# Patient Record
Sex: MALE | Race: WHITE | NOT HISPANIC OR LATINO | Employment: OTHER | URBAN - METROPOLITAN AREA
[De-identification: names, ages, dates, MRNs, and addresses within clinical notes are randomized per-mention and may not be internally consistent; named-entity substitution may affect disease eponyms.]

---

## 2017-07-25 ENCOUNTER — APPOINTMENT (OUTPATIENT)
Dept: RADIOLOGY | Facility: MEDICAL CENTER | Age: 72
DRG: 392 | End: 2017-07-25
Attending: EMERGENCY MEDICINE
Payer: MEDICARE

## 2017-07-25 ENCOUNTER — HOSPITAL ENCOUNTER (INPATIENT)
Facility: MEDICAL CENTER | Age: 72
LOS: 3 days | DRG: 392 | End: 2017-07-28
Attending: EMERGENCY MEDICINE | Admitting: HOSPITALIST
Payer: MEDICARE

## 2017-07-25 ENCOUNTER — RESOLUTE PROFESSIONAL BILLING HOSPITAL PROF FEE (OUTPATIENT)
Dept: HOSPITALIST | Facility: MEDICAL CENTER | Age: 72
End: 2017-07-25
Payer: MEDICARE

## 2017-07-25 DIAGNOSIS — R50.9 ACUTE FEBRILE ILLNESS: ICD-10-CM

## 2017-07-25 DIAGNOSIS — R74.8 ELEVATED LIVER ENZYMES: ICD-10-CM

## 2017-07-25 DIAGNOSIS — R41.82 ALTERED MENTAL STATUS, UNSPECIFIED ALTERED MENTAL STATUS TYPE: ICD-10-CM

## 2017-07-25 PROBLEM — R63.0 ANOREXIA: Status: ACTIVE | Noted: 2017-07-25

## 2017-07-25 PROBLEM — E87.6 HYPOKALEMIA DUE TO LOSS OF POTASSIUM: Status: ACTIVE | Noted: 2017-07-25

## 2017-07-25 PROBLEM — R19.7 DIARRHEA: Status: ACTIVE | Noted: 2017-07-25

## 2017-07-25 PROBLEM — R74.01 TRANSAMINITIS: Status: ACTIVE | Noted: 2017-07-25

## 2017-07-25 LAB
ALBUMIN SERPL BCP-MCNC: 4 G/DL (ref 3.2–4.9)
ALBUMIN/GLOB SERPL: 1.4 G/DL
ALP SERPL-CCNC: 140 U/L (ref 30–99)
ALT SERPL-CCNC: 79 U/L (ref 2–50)
ANION GAP SERPL CALC-SCNC: 13 MMOL/L (ref 0–11.9)
APPEARANCE UR: CLEAR
AST SERPL-CCNC: 80 U/L (ref 12–45)
BASOPHILS # BLD AUTO: 0.5 % (ref 0–1.8)
BASOPHILS # BLD: 0.03 K/UL (ref 0–0.12)
BILIRUB SERPL-MCNC: 1.6 MG/DL (ref 0.1–1.5)
BILIRUB UR QL STRIP.AUTO: NEGATIVE
BUN SERPL-MCNC: 16 MG/DL (ref 8–22)
CALCIUM SERPL-MCNC: 8.9 MG/DL (ref 8.5–10.5)
CHLORIDE SERPL-SCNC: 99 MMOL/L (ref 96–112)
CO2 SERPL-SCNC: 22 MMOL/L (ref 20–33)
COLOR UR: YELLOW
CREAT SERPL-MCNC: 0.95 MG/DL (ref 0.5–1.4)
EOSINOPHIL # BLD AUTO: 0.15 K/UL (ref 0–0.51)
EOSINOPHIL NFR BLD: 2.3 % (ref 0–6.9)
ERYTHROCYTE [DISTWIDTH] IN BLOOD BY AUTOMATED COUNT: 38.8 FL (ref 35.9–50)
GFR SERPL CREATININE-BSD FRML MDRD: >60 ML/MIN/1.73 M 2
GLOBULIN SER CALC-MCNC: 2.9 G/DL (ref 1.9–3.5)
GLUCOSE SERPL-MCNC: 131 MG/DL (ref 65–99)
GLUCOSE UR STRIP.AUTO-MCNC: NEGATIVE MG/DL
HAV IGM SERPL QL IA: NEGATIVE
HBV CORE IGM SER QL: NEGATIVE
HBV SURFACE AG SER QL: NEGATIVE
HCT VFR BLD AUTO: 33.6 % (ref 42–52)
HCV AB SER QL: NEGATIVE
HGB BLD-MCNC: 12.3 G/DL (ref 14–18)
IMM GRANULOCYTES # BLD AUTO: 0.04 K/UL (ref 0–0.11)
IMM GRANULOCYTES NFR BLD AUTO: 0.6 % (ref 0–0.9)
KETONES UR STRIP.AUTO-MCNC: NEGATIVE MG/DL
LACTATE BLD-SCNC: 1.3 MMOL/L (ref 0.5–2)
LACTATE BLD-SCNC: 1.5 MMOL/L (ref 0.5–2)
LEUKOCYTE ESTERASE UR QL STRIP.AUTO: NEGATIVE
LYMPHOCYTES # BLD AUTO: 0.25 K/UL (ref 1–4.8)
LYMPHOCYTES NFR BLD: 3.8 % (ref 22–41)
MCH RBC QN AUTO: 30.7 PG (ref 27–33)
MCHC RBC AUTO-ENTMCNC: 36.6 G/DL (ref 33.7–35.3)
MCV RBC AUTO: 83.8 FL (ref 81.4–97.8)
MICRO URNS: NORMAL
MONOCYTES # BLD AUTO: 0.48 K/UL (ref 0–0.85)
MONOCYTES NFR BLD AUTO: 7.3 % (ref 0–13.4)
NEUTROPHILS # BLD AUTO: 5.64 K/UL (ref 1.82–7.42)
NEUTROPHILS NFR BLD: 85.5 % (ref 44–72)
NITRITE UR QL STRIP.AUTO: NEGATIVE
NRBC # BLD AUTO: 0 K/UL
NRBC BLD AUTO-RTO: 0 /100 WBC
PH UR STRIP.AUTO: 6.5 [PH]
PLATELET # BLD AUTO: 135 K/UL (ref 164–446)
PMV BLD AUTO: 10 FL (ref 9–12.9)
POTASSIUM SERPL-SCNC: 2.7 MMOL/L (ref 3.6–5.5)
PROT SERPL-MCNC: 6.9 G/DL (ref 6–8.2)
PROT UR QL STRIP: NEGATIVE MG/DL
RBC # BLD AUTO: 4.01 M/UL (ref 4.7–6.1)
RBC UR QL AUTO: NEGATIVE
SODIUM SERPL-SCNC: 134 MMOL/L (ref 135–145)
SP GR UR STRIP.AUTO: 1.01
UROBILINOGEN UR STRIP.AUTO-MCNC: 1 MG/DL
WBC # BLD AUTO: 6.6 K/UL (ref 4.8–10.8)

## 2017-07-25 PROCEDURE — 81003 URINALYSIS AUTO W/O SCOPE: CPT

## 2017-07-25 PROCEDURE — 96365 THER/PROPH/DIAG IV INF INIT: CPT

## 2017-07-25 PROCEDURE — 700102 HCHG RX REV CODE 250 W/ 637 OVERRIDE(OP): Performed by: EMERGENCY MEDICINE

## 2017-07-25 PROCEDURE — A9270 NON-COVERED ITEM OR SERVICE: HCPCS | Performed by: EMERGENCY MEDICINE

## 2017-07-25 PROCEDURE — 80074 ACUTE HEPATITIS PANEL: CPT

## 2017-07-25 PROCEDURE — 71010 DX-CHEST-PORTABLE (1 VIEW): CPT

## 2017-07-25 PROCEDURE — 96367 TX/PROPH/DG ADDL SEQ IV INF: CPT

## 2017-07-25 PROCEDURE — 80053 COMPREHEN METABOLIC PANEL: CPT

## 2017-07-25 PROCEDURE — 700105 HCHG RX REV CODE 258: Performed by: EMERGENCY MEDICINE

## 2017-07-25 PROCEDURE — 770006 HCHG ROOM/CARE - MED/SURG/GYN SEMI*

## 2017-07-25 PROCEDURE — 85025 COMPLETE CBC W/AUTO DIFF WBC: CPT

## 2017-07-25 PROCEDURE — 99223 1ST HOSP IP/OBS HIGH 75: CPT | Mod: AI | Performed by: HOSPITALIST

## 2017-07-25 PROCEDURE — 83690 ASSAY OF LIPASE: CPT

## 2017-07-25 PROCEDURE — 99285 EMERGENCY DEPT VISIT HI MDM: CPT

## 2017-07-25 PROCEDURE — 36415 COLL VENOUS BLD VENIPUNCTURE: CPT

## 2017-07-25 PROCEDURE — 700111 HCHG RX REV CODE 636 W/ 250 OVERRIDE (IP): Performed by: EMERGENCY MEDICINE

## 2017-07-25 PROCEDURE — 83605 ASSAY OF LACTIC ACID: CPT

## 2017-07-25 PROCEDURE — 87040 BLOOD CULTURE FOR BACTERIA: CPT | Mod: 91

## 2017-07-25 PROCEDURE — 87086 URINE CULTURE/COLONY COUNT: CPT

## 2017-07-25 RX ORDER — POTASSIUM CHLORIDE 20 MEQ/1
40 TABLET, EXTENDED RELEASE ORAL ONCE
Status: COMPLETED | OUTPATIENT
Start: 2017-07-25 | End: 2017-07-25

## 2017-07-25 RX ORDER — CEFTRIAXONE 1 G/1
1 INJECTION, POWDER, FOR SOLUTION INTRAMUSCULAR; INTRAVENOUS ONCE
Status: COMPLETED | OUTPATIENT
Start: 2017-07-25 | End: 2017-07-25

## 2017-07-25 RX ORDER — POTASSIUM CHLORIDE 7.45 MG/ML
10 INJECTION INTRAVENOUS ONCE
Status: COMPLETED | OUTPATIENT
Start: 2017-07-25 | End: 2017-07-25

## 2017-07-25 RX ORDER — NIACIN 500 MG/1
500 TABLET, EXTENDED RELEASE ORAL NIGHTLY
COMMUNITY

## 2017-07-25 RX ORDER — LOSARTAN POTASSIUM AND HYDROCHLOROTHIAZIDE 25; 100 MG/1; MG/1
1 TABLET ORAL DAILY
COMMUNITY

## 2017-07-25 RX ORDER — ROSUVASTATIN CALCIUM 10 MG/1
10 TABLET, COATED ORAL DAILY
COMMUNITY

## 2017-07-25 RX ORDER — SODIUM CHLORIDE AND POTASSIUM CHLORIDE 150; 900 MG/100ML; MG/100ML
INJECTION, SOLUTION INTRAVENOUS CONTINUOUS
Status: DISCONTINUED | OUTPATIENT
Start: 2017-07-26 | End: 2017-07-28 | Stop reason: HOSPADM

## 2017-07-25 RX ORDER — ACETAMINOPHEN 325 MG/1
650 TABLET ORAL EVERY 6 HOURS PRN
Status: DISCONTINUED | OUTPATIENT
Start: 2017-07-25 | End: 2017-07-28 | Stop reason: HOSPADM

## 2017-07-25 RX ORDER — HYDRALAZINE HYDROCHLORIDE 25 MG/1
25 TABLET, FILM COATED ORAL 2 TIMES DAILY
COMMUNITY

## 2017-07-25 RX ORDER — SODIUM CHLORIDE 9 MG/ML
1000 INJECTION, SOLUTION INTRAVENOUS ONCE
Status: COMPLETED | OUTPATIENT
Start: 2017-07-25 | End: 2017-07-26

## 2017-07-25 RX ORDER — MELOXICAM 15 MG/1
15 TABLET ORAL DAILY
COMMUNITY

## 2017-07-25 RX ORDER — ONDANSETRON 4 MG/1
4 TABLET, ORALLY DISINTEGRATING ORAL EVERY 4 HOURS PRN
Status: DISCONTINUED | OUTPATIENT
Start: 2017-07-25 | End: 2017-07-28 | Stop reason: HOSPADM

## 2017-07-25 RX ORDER — HEPARIN SODIUM 5000 [USP'U]/ML
5000 INJECTION, SOLUTION INTRAVENOUS; SUBCUTANEOUS EVERY 8 HOURS
Status: DISCONTINUED | OUTPATIENT
Start: 2017-07-26 | End: 2017-07-28 | Stop reason: HOSPADM

## 2017-07-25 RX ORDER — SODIUM CHLORIDE 9 MG/ML
1000 INJECTION, SOLUTION INTRAVENOUS ONCE
Status: COMPLETED | OUTPATIENT
Start: 2017-07-25 | End: 2017-07-25

## 2017-07-25 RX ORDER — ONDANSETRON 2 MG/ML
4 INJECTION INTRAMUSCULAR; INTRAVENOUS EVERY 4 HOURS PRN
Status: DISCONTINUED | OUTPATIENT
Start: 2017-07-25 | End: 2017-07-28 | Stop reason: HOSPADM

## 2017-07-25 RX ADMIN — POTASSIUM CHLORIDE 10 MEQ: 10 INJECTION, SOLUTION INTRAVENOUS at 20:03

## 2017-07-25 RX ADMIN — SODIUM CHLORIDE 1000 ML: 9 INJECTION, SOLUTION INTRAVENOUS at 20:03

## 2017-07-25 RX ADMIN — SODIUM CHLORIDE 1000 ML: 9 INJECTION, SOLUTION INTRAVENOUS at 23:25

## 2017-07-25 RX ADMIN — POTASSIUM CHLORIDE 40 MEQ: 1500 TABLET, EXTENDED RELEASE ORAL at 20:03

## 2017-07-25 RX ADMIN — CEFTRIAXONE SODIUM 1 G: 1 INJECTION, POWDER, FOR SOLUTION INTRAMUSCULAR; INTRAVENOUS at 21:05

## 2017-07-25 ASSESSMENT — PAIN SCALES - WONG BAKER: WONGBAKER_NUMERICALRESPONSE: DOESN'T HURT AT ALL

## 2017-07-25 ASSESSMENT — LIFESTYLE VARIABLES: DO YOU DRINK ALCOHOL: NO

## 2017-07-25 NOTE — IP AVS SNAPSHOT
" Home Care Instructions                                                                                                                  Name:Geovanny Ibarra  Medical Record Number:[id number]  CSN: [id number]    YOB: 1945   Age: 72 y.o.  Sex: male  HT:1.727 m (5' 7.99\") WT: 82.8 kg (182 lb 8.7 oz)          Admit Date: 7/25/2017     Discharge Date:   Today's Date: 7/28/2017  Attending Doctor:  Nitihs Watson M.D.                  Allergies:  Review of patient's allergies indicates no known allergies.            Discharge Instructions       Discharge Instructions    Discharged to home by car with relative. Discharged via walking, hospital escort: Refused.  Special equipment needed: Not Applicable    Be sure to schedule a follow-up appointment with your primary care doctor or any specialists as instructed.     Discharge Plan:   Diet Plan: Discussed  Activity Level: Discussed  Confirmed Follow up Appointment: Patient to Call and Schedule Appointment  Confirmed Symptoms Management: Discussed  Medication Reconciliation Updated: Yes  Influenza Vaccine Indication: Patient Refuses    I understand that a diet low in cholesterol, fat, and sodium is recommended for good health. Unless I have been given specific instructions below for another diet, I accept this instruction as my diet prescription.   Other diet: Regular    Special Instructions: Sepsis, Adult  Sepsis is a serious infection of your blood or tissues that affects your whole body. The infection that causes sepsis may be bacterial, viral, fungal, or parasitic. Sepsis may be life threatening. Sepsis can cause your blood pressure to drop. This may result in shock. Shock causes your central nervous system and your organs to stop working correctly.   RISK FACTORS  Sepsis can happen in anyone, but it is more likely to happen in people who have weakened immune systems.  SIGNS AND SYMPTOMS   Symptoms of sepsis can include:  · Fever or low body temperature " (hypothermia).  · Rapid breathing (hyperventilation).  · Chills.  · Rapid heartbeat (tachycardia).  · Confusion or light-headedness.  · Trouble breathing.  · Urinating much less than usual.  · Cool, clammy skin or red, flushed skin.  · Other problems with the heart, kidneys, or brain.  DIAGNOSIS   Your health care provider will likely do tests to look for an infection, to see if the infection has spread to your blood, and to see how serious your condition is. Tests can include:  · Blood tests, including cultures of your blood.  · Cultures of other fluids from your body, such as:  ¨ Urine.  ¨ Pus from wounds.  ¨ Mucus coughed up from your lungs.  · Urine tests other than cultures.  · X-ray exams or other imaging tests.  TREATMENT   Treatment will begin with elimination of the source of infection. If your sepsis is likely caused by a bacterial or fungal infection, you will be given antibiotic or antifungal medicines.  You may also receive:  · Oxygen.  · Fluids through an IV tube.  · Medicines to increase your blood pressure.  · A machine to clean your blood (dialysis) if your kidneys fail.  · A machine to help you breathe if your lungs fail.  SEEK IMMEDIATE MEDICAL CARE IF:  You get an infection or develop any of the signs and symptoms of sepsis after surgery or a hospitalization.     This information is not intended to replace advice given to you by your health care provider. Make sure you discuss any questions you have with your health care provider.     Document Released: 09/15/2004 Document Revised: 05/03/2016 Document Reviewed: 08/25/2014  [employer] Interactive Patient Education ©2016 [employer]      · Is patient discharged on Warfarin / Coumadin?   No     · Is patient Post Blood Transfusion?  No    Depression / Suicide Risk    As you are discharged from this [facility] facility, it is important to learn how to keep safe from harming yourself.    Recognize the warning signs:  · Abrupt changes in personality,  positive or negative- including increase in energy   · Giving away possessions  · Change in eating patterns- significant weight changes-  positive or negative  · Change in sleeping patterns- unable to sleep or sleeping all the time   · Unwillingness or inability to communicate  · Depression  · Unusual sadness, discouragement and loneliness  · Talk of wanting to die  · Neglect of personal appearance   · Rebelliousness- reckless behavior  · Withdrawal from people/activities they love  · Confusion- inability to concentrate     If you or a loved one observes any of these behaviors or has concerns about self-harm, here's what you can do:  · Talk about it- your feelings and reasons for harming yourself  · Remove any means that you might use to hurt yourself (examples: pills, rope, extension cords, firearm)  · Get professional help from the community (Mental Health, Substance Abuse, psychological counseling)  · Do not be alone:Call your Safe Contact- someone whom you trust who will be there for you.  · Call your local CRISIS HOTLINE 668-8029 or 087-397-7687  · Call your local Children's Mobile Crisis Response Team [place] (260) 834-4772 or wwwIngresse  · Call the toll free National Suicide Prevention Hotlines   · National Suicide Prevention Lifeline 317-703-OXNS (9936)  · National Hope Line Network 800-SUICIDE (895-9583)    Thrombocytopenia  Thrombocytopenia means there are not enough platelets in your blood. Platelets are tiny cells in your blood. When you start bleeding, platelets clump together around the cut or injury to stop the bleeding. This process is called blood clotting. Not having enough platelets can cause bleeding problems.  HOME CARE  Check your skin and inside your mouth for bruises or blood as told by your doctor.  Check your spit (sputum), pee (urine), and poop (stool) for blood as told by your doctor.  Do not do activities that can cause bumps or bruises until your doctor says it is  okay.  Be careful not to cut yourself when you shave or use scissors, needles, knives, or other tools.  Be careful not to burn yourself when you iron or cook.  Ask your doctor if you can drink alcohol.  Only take medicines as told by your doctor.  Tell all your doctors and your dentist that you have this bleeding problem.  GET HELP RIGHT AWAY IF:  You are bleeding anywhere on your body.  You are bleeding or have bruises without knowing why.  You have blood in your spit, pee, or poop.  MAKE SURE YOU:  Understand these instructions.  Will watch your condition.  Will get help right away if you are not doing well or get worse.     This information is not intended to replace advice given to you by your health care provider. Make sure you discuss any questions you have with your health care provider.     Document Released: 12/06/2012 Document Revised: 03/11/2013 Document Reviewed: 12/06/2012  [employer] Interactive Patient Education ©2016 [employer]      Anemia, Frequently Asked Questions  WHAT ARE THE SYMPTOMS OF ANEMIA?  · Headache.   · Difficulty thinking.   · Fatigue.   · Shortness of breath.   · Weakness.   · Rapid heartbeat.   AT WHAT POINT ARE PEOPLE CONSIDERED ANEMIC?   This varies with gender and age.   · Both hemoglobin (Hgb) and hematocrit values are used to define anemia. These lab values are obtained from a complete blood count (CBC) test. This is performed at a caregiver's office.   · The normal range of hemoglobin values for adult men is 14.0 g/dL to 17.4 g/dL. For nonpregnant women, values are 12.3 g/dL to 15.3 g/dL.   · The World Health Organization defines anemia as less than 12 g/dL for nonpregnant women and less than 13 g/dL for men.   · For adult males, the average normal hematocrit is 46%, and the range is 40% to 52%.   · For adult females, the average normal hematocrit is 41%, and the range is 35% to 47%.   · Values that fall below the lower limits can be a sign of anemia and should have further  checking (evaluation).   GROUPS OF PEOPLE WHO ARE AT RISK FOR DEVELOPING ANEMIA INCLUDE:   · Infants who are  or taking a formula that is not fortified with iron.   · Children going through a rapid growth spurt. The iron available can not keep up with the needs for a red cell mass which must grow with the child.   · Women in childbearing years. They need iron because of blood loss during menstruation.   · Pregnant women. The growing fetus creates a high demand for iron.   · People with ongoing gastrointestinal blood loss are at risk of developing iron deficiency.   · Individuals with leukemia or cancer who must receive chemotherapy or radiation to treat their disease. The drugs or radiation used to treat these diseases often decreases the bone marrow's ability to make cells of all classes. This includes red blood cells, white blood cells, and platelets.   · Individuals with chronic inflammatory conditions such as rheumatoid arthritis or chronic infections.   · The elderly.   ARE SOME TYPES OF ANEMIA INHERITED?   · Yes, some types of anemia are due to inherited or genetic defects.   · Sickle cell anemia. This occurs most often in people of , , and Mediterranean descent.   · Thalassemia (or Fitch's anemia). This type is found in people of Mediterranean and Southeast  descent. These types of anemia are common.   · Fanconi. This is rare.   CAN CERTAIN MEDICATIONS CAUSE A PERSON TO BECOME ANEMIC?   Yes. For example, drugs to fight cancer (chemotherapeutic agents) often cause anemia. These drugs can slow the bone marrow's ability to make red blood cells. If there are not enough red blood cells, the body does not get enough oxygen.  WHAT HEMATOCRIT LEVEL IS REQUIRED TO DONATE BLOOD?   The lower limit of an acceptable hematocrit for blood donors is 38%. If you have a low hematocrit value, you should schedule an appointment with your caregiver.  ARE BLOOD TRANSFUSIONS COMMONLY USED TO  CORRECT ANEMIA, AND ARE THEY DANGEROUS?   They are used to treat anemia as a last resort. Your caregiver will find the cause of the anemia and correct it if possible. Most blood transfusions are given because of excessive bleeding at the time of surgery, with trauma, or because of bone marrow suppression in patients with cancer or leukemia on chemotherapy. Blood transfusions are safer than ever before. We also know that blood transfusions affect the immune system and may increase certain risks. There is also a concern for human error. In 1/16,000 transfusions, a patient receives a transfusion of blood that is not matched with his or her blood type.   WHAT IS IRON DEFICIENCY ANEMIA AND CAN I CORRECT IT BY CHANGING MY DIET?   Iron is an essential part of hemoglobin. Without enough hemoglobin, anemia develops and the body does not get the right amount of oxygen. Iron deficiency anemia develops after the body has had a low level of iron for a long time. This is either caused by blood loss, not taking in or absorbing enough iron, or increased demands for iron (like pregnancy or rapid growth).   Foods from animal origin such as beef, chicken, and pork, are good sources of iron. Be sure to have one of these foods at each meal. Vitamin C helps your body absorb iron. Foods rich in Vitamin C include citrus, bell pepper, strawberries, spinach and cantaloupe. In some cases, iron supplements may be needed in order to correct the iron deficiency. In the case of poor absorption, extra iron may have to be given directly into the vein through a needle (intravenously).  I HAVE BEEN DIAGNOSED WITH IRON DEFICIENCY ANEMIA AND MY CAREGIVER PRESCRIBED IRON SUPPLEMENTS. HOW LONG WILL IT TAKE FOR MY BLOOD TO BECOME NORMAL?   It depends on the degree of anemia at the beginning of treatment. Most people with mild to moderate iron deficiency, anemia will correct the anemia over a period of 2 to 3 months. But after the anemia is corrected, the  iron stored by the body is still low. Caregivers often suggest an additional 6 months of oral iron therapy once the anemia has been reversed. This will help prevent the iron deficiency anemia from quickly happening again. Non-anemic adult males should take iron supplements only under the direction of a doctor, too much iron can cause liver damage.   MY HEMOGLOBIN IS 9 G/DL AND I AM SCHEDULED FOR SURGERY. SHOULD I POSTPONE THE SURGERY?   If you have Hgb of 9, you should discuss this with your caregiver right away. Many patients with similar hemoglobin levels have had surgery without problems. If minimal blood loss is expected for a minor procedure, no treatment may be necessary.   If a greater blood loss is expected for more extensive procedures, you should ask your caregiver about being treated with erythropoietin and iron. This is to accelerate the recovery of your hemoglobin to a normal level before surgery. An anemic patient who undergoes high-blood-loss surgery has a greater risk of surgical complications and need for a blood transfusion, which also carries some risk.   I HAVE BEEN TOLD THAT HEAVY MENSTRUAL PERIODS CAUSE ANEMIA. IS THERE ANYTHING I CAN DO TO PREVENT THE ANEMIA?   Anemia that results from heavy periods is usually due to iron deficiency. You can try to meet the increased demands for iron caused by the heavy monthly blood loss by increasing the intake of iron-rich foods. Iron supplements may be required. Discuss your concerns with your caregiver.  WHAT CAUSES ANEMIA DURING PREGNANCY?   Pregnancy places major demands on the body. The mother must meet the needs of both her body and her growing baby. The body needs enough iron and folate to make the right amount of red blood cells. To prevent anemia while pregnant, the mother should stay in close contact with her caregiver.   Be sure to eat a diet that has foods rich in iron and folate like liver and dark green leafy vegetables. Folate plays an  important role in the normal development of a baby's spinal cord. Folate can help prevent serious disorders like spina bifida. If your diet does not provide adequate nutrients, you may want to talk with your caregiver about nutritional supplements.   WHAT IS THE RELATIONSHIP BETWEEN FIBROID TUMORS AND ANEMIA IN WOMEN?   The relationship is usually caused by the increased menstrual blood loss caused by fibroids. Good iron intake may be required to prevent iron deficiency anemia from developing.   Document Released: 07/26/2005 Document Revised: 03/11/2013 Document Reviewed: 01/10/2012  [employer]® Patient Information ©2013 [employer].    Hypokalemia  Hypokalemia means that the amount of potassium in the blood is lower than normal. Potassium is a chemical, called an electrolyte, that helps regulate the amount of fluid in the body. It also stimulates muscle contraction and helps nerves function properly. Most of the body's potassium is inside of cells, and only a very small amount is in the blood. Because the amount in the blood is so small, minor changes can be life-threatening.  CAUSES  · Antibiotics.  · Diarrhea or vomiting.  · Using laxatives too much, which can cause diarrhea.  · Chronic kidney disease.  · Water pills (diuretics).  · Eating disorders (bulimia).  · Low magnesium level.  · Sweating a lot.  SIGNS AND SYMPTOMS  · Weakness.  · Constipation.  · Fatigue.  · Muscle cramps.  · Mental confusion.  · Skipped heartbeats or irregular heartbeat (palpitations).  · Tingling or numbness.  DIAGNOSIS   Your health care provider can diagnose hypokalemia with blood tests. In addition to checking your potassium level, your health care provider may also check other lab tests.  TREATMENT  Hypokalemia can be treated with potassium supplements taken by mouth or adjustments in your current medicines. If your potassium level is very low, you may need to get potassium through a vein (IV) and be monitored in the hospital. A  diet high in potassium is also helpful. Foods high in potassium are:  · Nuts, such as peanuts and pistachios.  · Seeds, such as sunflower seeds and pumpkin seeds.  · Peas, lentils, and lima beans.  · Whole grain and bran cereals and breads.  · Fresh fruit and vegetables, such as apricots, avocado, bananas, cantaloupe, kiwi, oranges, tomatoes, asparagus, and potatoes.  · Orange and tomato juices.  · Red meats.  · Fruit yogurt.  HOME CARE INSTRUCTIONS  · Take all medicines as prescribed by your health care provider.  · Maintain a healthy diet by including nutritious food, such as fruits, vegetables, nuts, whole grains, and lean meats.  · If you are taking a laxative, be sure to follow the directions on the label.  SEEK MEDICAL CARE IF:  · Your weakness gets worse.  · You feel your heart pounding or racing.  · You are vomiting or having diarrhea.  · You are diabetic and having trouble keeping your blood glucose in the normal range.  SEEK IMMEDIATE MEDICAL CARE IF:  · You have chest pain, shortness of breath, or dizziness.  · You are vomiting or having diarrhea for more than 2 days.  · You faint.  MAKE SURE YOU:   · Understand these instructions.  · Will watch your condition.  · Will get help right away if you are not doing well or get worse.     This information is not intended to replace advice given to you by your health care provider. Make sure you discuss any questions you have with your health care provider.     Document Released: 12/18/2006 Document Revised: 01/08/2016 Document Reviewed: 06/20/2014  [employer] Interactive Patient Education ©2016 [employer]    Ciprofloxacin extended-release tablets  What is this medicine?  CIPROFLOXACIN (sip dorene FLOX a sin) is a quinolone antibiotic. It is used to treat certain kinds of bacterial infections. It will not work for colds, flu, or other viral infections.  This medicine may be used for other purposes; ask your health care provider or pharmacist if you have  questions.  COMMON BRAND NAME(S): Cipro XR, Proquin XR  What should I tell my health care provider before I take this medicine?  They need to know if you have any of these conditions:  -bone problems  -cerebral disease  -  irregular heartbeat  -joint problems  -kidney disease  -liver disease  -myasthenia gravis  -seizures disorder  -tendon problems  -an unusual or allergic reaction to ciprofloxacin, other antibiotics or medicines, foods, dyes, or preservatives  -pregnant or trying to get pregnant  -breast-feeding  How should I use this medicine?  Take this medicine by mouth with a full glass of water. Follow the directions on the prescription label. Do not split, crush, or chew the tablet. Take your medicine at regular intervals. Do not take your medicine more often than directed. Take all of your medicine as directed even if you think your are better. Do not skip doses or stop your medicine early.  You can take this medicine with food or on an empty stomach. It can be taken with a meal that contains dairy or calcium, but do not take it alone with a dairy product, like milk or yogurt, or calcium-fortified juice.  A special MedGuide will be given to you by the pharmacist with each prescription and refill. Be sure to read this information carefully each time.  Talk to your pediatrician regarding the use of this medicine in children. Special care may be needed.  Overdosage: If you think you have taken too much of this medicine contact a poison control center or emergency room at once.  NOTE: This medicine is only for you. Do not share this medicine with others.  What if I miss a dose?  If you miss a dose, take it as soon as you can. If it is almost time for your next dose, take only that dose. Do not take double or extra doses. Do not take more than one dose in a day.  What may interact with this medicine?  Do not take this medicine with any of the following  medications:  -cisapride  -droperidol  -terfenadine  -tizanidine  This medicine may also interact with the following medications:  -antacids  -caffeine  -cyclosporin  -didanosine (ddI) buffered tablets or powder  -medicines for diabetes  -medicines for inflammation like ibuprofen, naproxen  -methotrexate  -multivitamins  -omeprazole  -phenytoin  -probenecid  -sucralfate  -theophylline  -warfarin  This list may not describe all possible interactions. Give your health care provider a list of all the medicines, herbs, non-prescription drugs, or dietary supplements you use. Also tell them if you smoke, drink alcohol, or use illegal drugs. Some items may interact with your medicine.  What should I watch for while using this medicine?  Tell your doctor or health care professional if your symptoms do not improve.  Do not treat diarrhea with over the counter products. Contact your doctor if you have diarrhea that lasts more than 2 days or if it is severe and watery.  You may get drowsy or dizzy. Do not drive, use machinery, or do anything that needs mental alertness until you know how this medicine affects you. Do not stand or sit up quickly, especially if you are an older patient. This reduces the risk of dizzy or fainting spells.  This medicine can make you more sensitive to the sun. Keep out of the sun. If you cannot avoid being in the sun, wear protective clothing and use sunscreen. Do not use sun lamps or tanning beds/booths.  Avoid antacids, aluminum, calcium, iron, magnesium, and zinc products for 6 hours before and 2 hours after taking a dose of this medicine.  What side effects may I notice from receiving this medicine?  Side effects that you should report to your doctor or health care professional as soon as possible:  -  allergic reactions like skin rash, itching or hives, swelling of the face, lips, or tongue  -  breathing problems  -  confusion, nightmares or hallucinations  -  feeling faint or lightheaded,  falls  -  irregular heartbeat  -  joint, muscle or tendon pain or swelling  -  pain or trouble passing urine  -persistent headache with or without blurred vision  -  redness, blistering, peeling or loosening of the skin, including inside the mouth  -  seizures  -  unusual pain, numbness, tingling, or weakness  Side effects that usually do not require medical attention (report to your doctor or health care professional if they continue or are bothersome):  -  diarrhea  -  nausea or stomach upset  -  white patches or sores in the mouth  This list may not describe all possible side effects. Call your doctor for medical advice about side effects. You may report side effects to FDA at 7-061-FDA-8375.  Where should I keep my medicine?  Keep out of the reach of children.  Store at room temperature between 15 to 30 degrees C (59 to 86 degrees F). Keep container tightly closed. Throw away any unused medicine after the expiration date.  NOTE: This sheet is a summary. It may not cover all possible information. If you have questions about this medicine, talk to your doctor, pharmacist, or health care provider.  © 2014, [employer]. (1/4/2013 12:48:34 PM)  Metronidazole tablets or capsules  What is this medicine?  METRONIDAZOLE (me troe NI da zole) is an antiinfective. It is used to treat certain kinds of bacterial and protozoal infections. It will not work for colds, flu, or other viral infections.  This medicine may be used for other purposes; ask your health care provider or pharmacist if you have questions.  COMMON BRAND NAME(S): Flagyl  What should I tell my health care provider before I take this medicine?  They need to know if you have any of these conditions:  -anemia or other blood disorders  -disease of the nervous system  -fungal or yeast infection  -if you drink alcohol containing drinks  -liver disease  -seizures  -an unusual or allergic reaction to metronidazole, or other medicines, foods, dyes, or  preservatives  -pregnant or trying to get pregnant  -breast-feeding  How should I use this medicine?  Take this medicine by mouth with a full glass of water. Follow the directions on the prescription label. Take your medicine at regular intervals. Do not take your medicine more often than directed. Take all of your medicine as directed even if you think you are better. Do not skip doses or stop your medicine early.  Talk to your pediatrician regarding the use of this medicine in children. Special care may be needed.  Overdosage: If you think you have taken too much of this medicine contact a poison control center or emergency room at once.  NOTE: This medicine is only for you. Do not share this medicine with others.  What if I miss a dose?  If you miss a dose, take it as soon as you can. If it is almost time for your next dose, take only that dose. Do not take double or extra doses.  What may interact with this medicine?  Do not take this medicine with any of the following medications:  -alcohol or any product that contains alcohol  -amprenavir oral solution  -disulfiram  -paclitaxel injection  -ritonavir oral solution  -sertraline oral solution  -sulfamethoxazole-trimethoprim injection  This medicine may also interact with the following medications:  -cimetidine  -lithium  -phenobarbital  -phenytoin  -warfarin  This list may not describe all possible interactions. Give your health care provider a list of all the medicines, herbs, non-prescription drugs, or dietary supplements you use. Also tell them if you smoke, drink alcohol, or use illegal drugs. Some items may interact with your medicine.  What should I watch for while using this medicine?  Tell your doctor or health care professional if your symptoms do not improve or if they get worse.  You may get drowsy or dizzy. Do not drive, use machinery, or do anything that needs mental alertness until you know how this medicine affects you. Do not stand or sit up  quickly, especially if you are an older patient. This reduces the risk of dizzy or fainting spells.  Avoid alcoholic drinks while you are taking this medicine and for three days afterward. Alcohol may make you feel dizzy, sick, or flushed.  If you are being treated for a sexually transmitted disease, avoid sexual contact until you have finished your treatment. Your sexual partner may also need treatment.  What side effects may I notice from receiving this medicine?  Side effects that you should report to your doctor or health care professional as soon as possible:  -allergic reactions like skin rash or hives, swelling of the face, lips, or tongue  -confusion, clumsiness  -difficulty speaking  -discolored or sore mouth  -dizziness  -fever, infection  -numbness, tingling, pain or weakness in the hands or feet  -trouble passing urine or change in the amount of urine  -redness, blistering, peeling or loosening of the skin, including inside the mouth  -seizures  -unusually weak or tired  -vaginal irritation, dryness, or discharge  Side effects that usually do not require medical attention (report to your doctor or health care professional if they continue or are bothersome):  -diarrhea  -headache  -irritability  -metallic taste  -nausea  -stomach pain or cramps  -trouble sleeping  This list may not describe all possible side effects. Call your doctor for medical advice about side effects. You may report side effects to FDA at 9-278-FDA-0763.  Where should I keep my medicine?  Keep out of the reach of children.  Store at room temperature below 25 degrees C (77 degrees F). Protect from light. Keep container tightly closed. Throw away any unused medicine after the expiration date.  NOTE: This sheet is a summary. It may not cover all possible information. If you have questions about this medicine, talk to your doctor, pharmacist, or health care provider.  © 2014, [employer]. (10/5/2009 10:37:11 AM)      Follow-up  Information     1. Follow up with Pcp Not In Computer In 2 weeks.    Specialty:  Family Medicine         Discharge Medication Instructions:    Below are the medications your physician expects you to take upon discharge:    Review all your home medications and newly ordered medications with your doctor and/or pharmacist. Follow medication instructions as directed by your doctor and/or pharmacist.    Please keep your medication list with you and share with your physician.               Medication List      START taking these medications        Instructions    Morning Afternoon Evening Bedtime    ciprofloxacin 500 MG Tabs   Commonly known as:  CIPRO   Next Dose Due:  Today           Take 1 Tab by mouth 2 times a day for 4 days.   Dose:  500 mg                        ferrous sulfate 325 (65 FE) MG EC tablet   Next Dose Due:  Today           Take 1 Tab by mouth 3 times a day, with meals.   Dose:  325 mg                        metronidazole 500 MG Tabs   Commonly known as:  FLAGYL   Next Dose Due:  Today         Take 1 Tab by mouth every 8 hours for 4 days.   Dose:  500 mg                          CONTINUE taking these medications        Instructions    Morning Afternoon Evening Bedtime    aspirin  MG Tbec   Last time this was given:  325 mg on 7/28/2017  7:49 AM   Commonly known as:  ECOTRIN   Next Dose Due:  Tomorrow           Take 325 mg by mouth every day.   Dose:  325 mg                        hydrALAZINE 25 MG Tabs   Commonly known as:  APRESOLINE   Next Dose Due:  Resume home regimen         Take 25 mg by mouth 2 Times a Day.   Dose:  25 mg                        losartan-hydrochlorothiazide 100-25 MG per tablet   Commonly known as:  HYZAAR   Next Dose Due:  Resume home regimen           Take 1 Tab by mouth every day.   Dose:  1 Tab                        meloxicam 15 MG tablet   Commonly known as:  MOBIC   Next Dose Due:  Resume home regimen           Take 15 mg by mouth every day.   Dose:  15 mg                         niacin  MG Tbcr   Commonly known as:  NIASPAN   Next Dose Due:  Resume home regimen           Take 500 mg by mouth every evening.   Dose:  500 mg                        rosuvastatin 10 MG Tabs   Commonly known as:  CRESTOR   Next Dose Due:  Resume home regimen           Take 10 mg by mouth every day.   Dose:  10 mg                             Where to Get Your Medications      These medications were sent to [id number] - [place], [place] - [address]  [address] [place] [address]     Phone:  [phone]    - ciprofloxacin 500 MG Tabs  - ferrous sulfate 325 (65 FE) MG EC tablet  - metronidazole 500 MG Tabs            Instructions           Diet / Nutrition:    Follow any diet instructions given to you by your doctor or the dietician, including how much salt (sodium) you are allowed each day.    If you are overweight, talk to your doctor about a weight reduction plan.    Activity:    Remain physically active following your doctor's instructions about exercise and activity.    Rest often.     Any time you become even a little tired or short of breath, SIT DOWN and rest.    Worsening Symptoms:    Report any of the following signs and symptoms to the doctor's office immediately:    *Pain of jaw, arm, or neck  *Chest pain not relieved by medication                               *Dizziness or loss of consciousness  *Difficulty breathing even when at rest   *More tired than usual                                       *Bleeding drainage or swelling of surgical site  *Swelling of feet, ankles, legs or stomach                 *Fever (>100ºF)  *Pink or blood tinged sputum  *Weight gain (3lbs/day or 5lbs /week)           *Shock from internal defibrillator (if applicable)  *Palpitations or irregular heartbeats                *Cool and/or numb extremities    Stroke Awareness    Common Risk Factors for Stroke include:    Age  Atrial Fibrillation  Carotid Artery Stenosis  Diabetes Mellitus  Excessive alcohol  consumption  High blood pressure  Overweight   Physical inactivity  Smoking    Warning signs and symptoms of a stroke include:    *Sudden numbness or weakness of the face, arm or leg (especially on one side of the body).  *Sudden confusion, trouble speaking or understanding.  *Sudden trouble seeing in one or both eyes.  *Sudden trouble walking, dizziness, loss of balance or coordination.Sudden severe headache with no known cause.    It is very important to get treatment quickly when a stroke occurs. If you experience any of the above warning signs, call 911 immediately.                   Disclaimer         Quit Smoking / Tobacco Use:    I understand the use of any tobacco products increases my chance of suffering from future heart disease or stroke and could cause other illnesses which may shorten my life. Quitting the use of tobacco products is the single most important thing I can do to improve my health. For further information on smoking / tobacco cessation call a Toll Free Quit Line at 1-225.915.6400 (*[facility]) or 1-327.148.7305 (American [employer] Association) or you can access the web based program at www.lungLeBUZZ.org.    [place] Tobacco Users Help Line:  (756) 430-2378       Toll Free: 1-620.563.5096  Quit Tobacco Program [facility] (860)708-7374    Crisis Hotline:    [place] Crisis Hotline:  3-626-JKTMQZS or 1-803.837.6728    [place] Crisis Hotline:    1-476.838.5208 or 347-820-6772    Discharge Survey:   Thank you for choosing [facility]. We hope we did everything we could to make your hospital stay a pleasant one. You may be receiving a phone survey and we would appreciate your time and participation in answering the questions. Your input is very valuable to us in our efforts to improve our service to our patients and their families.        My signature on this form indicates that:    1. I have reviewed and understand the above information.  2. My questions regarding  this information have been answered to my satisfaction.  3. I have formulated a plan with my discharge nurse to obtain my prescribed medications for home.                  Disclaimer         __________________________________                     __________       ________                       Patient Signature                                                 Date                    Time

## 2017-07-25 NOTE — IP AVS SNAPSHOT
" <p align=\"LEFT\"><IMG SRC=\"//[email or web address]$/[email or web address]\" alt=\"Image\" WIDTH=\"50%\" HEIGHT=\"200\" BORDER=\"\"></p>                   Name:Geovanny Ibarra  Medical Record Number:[id number]  CSN: [id number]    YOB: 1945   Age: 72 y.o.  Sex: male  HT:1.727 m (5' 7.99\") WT: 82.8 kg (182 lb 8.7 oz)          Admit Date: 7/25/2017     Discharge Date:   Today's Date: 7/28/2017  Attending Doctor:  Nitish Watson M.D.                  Allergies:  Review of patient's allergies indicates no known allergies.          Follow-up Information     1. Follow up with Pcp Not In Computer In 2 weeks.    Specialty:  Family Medicine         Medication List      Take these Medications        Instructions    aspirin  MG Tbec   Commonly known as:  ECOTRIN    Take 325 mg by mouth every day.   Dose:  325 mg       ciprofloxacin 500 MG Tabs   Commonly known as:  CIPRO    Take 1 Tab by mouth 2 times a day for 4 days.   Dose:  500 mg       ferrous sulfate 325 (65 FE) MG EC tablet    Take 1 Tab by mouth 3 times a day, with meals.   Dose:  325 mg       hydrALAZINE 25 MG Tabs   Commonly known as:  APRESOLINE    Take 25 mg by mouth 2 Times a Day.   Dose:  25 mg       losartan-hydrochlorothiazide 100-25 MG per tablet   Commonly known as:  HYZAAR    Take 1 Tab by mouth every day.   Dose:  1 Tab       meloxicam 15 MG tablet   Commonly known as:  MOBIC    Take 15 mg by mouth every day.   Dose:  15 mg       metronidazole 500 MG Tabs   Commonly known as:  FLAGYL    Take 1 Tab by mouth every 8 hours for 4 days.   Dose:  500 mg       niacin  MG Tbcr   Commonly known as:  NIASPAN    Take 500 mg by mouth every evening.   Dose:  500 mg       rosuvastatin 10 MG Tabs   Commonly known as:  CRESTOR    Take 10 mg by mouth every day.   Dose:  10 mg         "

## 2017-07-25 NOTE — IP AVS SNAPSHOT
Eyetronics Access Code: [email or web address]  Expires: 8/27/2017 10:58 AM    Your email address is not on file at [facility].  Email Addresses are required for you to sign up for Eyet[employer]ics, please contact [phone] to verify your personal information and to provide your email address prior to attempting to register for Eyetronics.    Geovanny Ibarra  [address] [place] [address]    Eyetronics  A secure, online tool to manage your health information     [facility]’s Eyetronics® is a secure, online tool that connects you to your personalized health information from the privacy of your home -- day or night - making it very easy for you to manage your healthcare. Once the activation process is completed, you can even access your medical information using the Eyetronics raheem, which is available for free in the Apple Raheem store or Google Play store.     To learn more about Eyetronics, visit [email or web address]    There are two levels of access available (as shown below):   My Chart Features  [facility] Primary Care Doctor [facility]  Specialists [facility]  Urgent  Care Non-[facility] Primary Care Doctor   Email your healthcare team securely and privately 24/7 X X X    Manage appointments: schedule your next appointment; view details of past/upcoming appointments X      Request prescription refills. X      View recent personal medical records, including lab and immunizations X X X X   View health record, including health history, allergies, medications X X X X   Read reports about your outpatient visits, procedures, consult and ER notes X X X X   See your discharge summary, which is a recap of your hospital and/or ER visit that includes your diagnosis, lab results, and care plan X X  X     How to register for E[employer]ics:  Once your e-mail address has been verified, follow the following steps to sign up for [employer].     1. Go to  [email or web address]  2. Click on the Sign Up Now box, which takes you to the New Member Sign  Up page. You will need to provide the following information:  a. Enter your i[employer]li Access Code exactly as it appears at the top of this page. (You will not need to use this code after you’ve completed the sign-up process. If you do not sign up before the expiration date, you must request a new code.)   b. Enter your date of birth.   c. Enter your home email address.   d. Click Submit, and follow the next screen’s instructions.  3. Create a iSuppli ID. This will be your iSuppli login ID and cannot be changed, so think of one that is secure and easy to remember.  4. Create a iSuppli password. You can change your password at any time.  5. Enter your Password Reset Question and Answer. This can be used at a later time if you forget your password.   6. Enter your e-mail address. This allows you to receive e-mail notifications when new information is available in iSuppli.  7. Click Sign Up. You can now view your health information.    For assistance activating your iSuppli account, call [phone]

## 2017-07-25 NOTE — IP AVS SNAPSHOT
7/28/2017    Geovanny Ibarra  [address] [address]    Dear Geovanny:    [facility] wants to ensure your discharge home is safe and you or your loved ones have had all of your questions answered regarding your care after you leave the hospital.    Below is a list of resources and contact information should you have any questions regarding your hospital stay, follow-up instructions, or active medical symptoms.    Questions or Concerns Regarding… Contact   Medical Questions Related to Your Discharge  (7 days a week, 8am-5pm) Contact a Nurse Care Coordinator   [phone]   Medical Questions Not Related to Your Discharge  (24 hours a day / 7 days a week)  Contact the Nurse Health Line   [phone]    Medications or Discharge Instructions Refer to your discharge packet   or contact your [facility] Primary Care Provider   [phone]   Follow-up Appointment(s) Schedule your appointment via [employer]   or contact Scheduling [phone]   Billing Review your statement via [employer]  or contact Billing [phone]   Medical Records Review your records via [employer]   or contact Medical Records [phone]     You may receive a telephone call within two days of discharge. This call is to make certain you understand your discharge instructions and have the opportunity to have any questions answered. You can also easily access your medical information, test results and upcoming appointments via the [employer] free online health management tool. You can learn more and sign up at [email or web address]. For assistance setting up your [employer] account, please call [phone].    Once again, we want to ensure your discharge home is safe and that you have a clear understanding of any next steps in your care. If you have any questions or concerns, please do not hesitate to contact us, we are here for you. Thank you for choosing [facility] for your healthcare needs.    Sincerely,    Your [facility] Healthcare Team

## 2017-07-26 ENCOUNTER — APPOINTMENT (OUTPATIENT)
Dept: RADIOLOGY | Facility: MEDICAL CENTER | Age: 72
DRG: 392 | End: 2017-07-26
Attending: HOSPITALIST
Payer: MEDICARE

## 2017-07-26 PROBLEM — D64.9 ANEMIA: Status: ACTIVE | Noted: 2017-07-26

## 2017-07-26 PROBLEM — D69.6 THROMBOCYTOPENIA (HCC): Status: ACTIVE | Noted: 2017-07-26

## 2017-07-26 LAB
ALBUMIN SERPL BCP-MCNC: 3.6 G/DL (ref 3.2–4.9)
ALBUMIN/GLOB SERPL: 1.4 G/DL
ALP SERPL-CCNC: 166 U/L (ref 30–99)
ALT SERPL-CCNC: 120 U/L (ref 2–50)
ANION GAP SERPL CALC-SCNC: 9 MMOL/L (ref 0–11.9)
ANISOCYTOSIS BLD QL SMEAR: ABNORMAL
AST SERPL-CCNC: 172 U/L (ref 12–45)
BASOPHILS # BLD AUTO: 0.9 % (ref 0–1.8)
BASOPHILS # BLD: 0.04 K/UL (ref 0–0.12)
BILIRUB SERPL-MCNC: 1.8 MG/DL (ref 0.1–1.5)
BUN SERPL-MCNC: 14 MG/DL (ref 8–22)
CALCIUM SERPL-MCNC: 8.4 MG/DL (ref 8.5–10.5)
CHLORIDE SERPL-SCNC: 104 MMOL/L (ref 96–112)
CO2 SERPL-SCNC: 25 MMOL/L (ref 20–33)
CREAT SERPL-MCNC: 0.9 MG/DL (ref 0.5–1.4)
EOSINOPHIL # BLD AUTO: 0.15 K/UL (ref 0–0.51)
EOSINOPHIL NFR BLD: 3.5 % (ref 0–6.9)
ERYTHROCYTE [DISTWIDTH] IN BLOOD BY AUTOMATED COUNT: 41.3 FL (ref 35.9–50)
GFR SERPL CREATININE-BSD FRML MDRD: >60 ML/MIN/1.73 M 2
GLOBULIN SER CALC-MCNC: 2.5 G/DL (ref 1.9–3.5)
GLUCOSE SERPL-MCNC: 116 MG/DL (ref 65–99)
HCT VFR BLD AUTO: 33.2 % (ref 42–52)
HGB BLD-MCNC: 11.3 G/DL (ref 14–18)
LIPASE SERPL-CCNC: 35 U/L (ref 11–82)
LYMPHOCYTES # BLD AUTO: 0.6 K/UL (ref 1–4.8)
LYMPHOCYTES NFR BLD: 13.9 % (ref 22–41)
MANUAL DIFF BLD: NORMAL
MCH RBC QN AUTO: 29.7 PG (ref 27–33)
MCHC RBC AUTO-ENTMCNC: 34 G/DL (ref 33.7–35.3)
MCV RBC AUTO: 87.1 FL (ref 81.4–97.8)
MICROCYTES BLD QL SMEAR: ABNORMAL
MONOCYTES # BLD AUTO: 0.04 K/UL (ref 0–0.85)
MONOCYTES NFR BLD AUTO: 0.9 % (ref 0–13.4)
MORPHOLOGY BLD-IMP: NORMAL
NEUTROPHILS # BLD AUTO: 3.47 K/UL (ref 1.82–7.42)
NEUTROPHILS NFR BLD: 75.6 % (ref 44–72)
NEUTS BAND NFR BLD MANUAL: 5.2 % (ref 0–10)
NRBC # BLD AUTO: 0 K/UL
NRBC BLD AUTO-RTO: 0 /100 WBC
PLATELET # BLD AUTO: 116 K/UL (ref 164–446)
PMV BLD AUTO: 10.1 FL (ref 9–12.9)
POTASSIUM SERPL-SCNC: 3.9 MMOL/L (ref 3.6–5.5)
PROT SERPL-MCNC: 6.1 G/DL (ref 6–8.2)
RBC # BLD AUTO: 3.81 M/UL (ref 4.7–6.1)
RBC BLD AUTO: PRESENT
SODIUM SERPL-SCNC: 138 MMOL/L (ref 135–145)
WBC # BLD AUTO: 4.3 K/UL (ref 4.8–10.8)

## 2017-07-26 PROCEDURE — 700117 HCHG RX CONTRAST REV CODE 255: Performed by: HOSPITALIST

## 2017-07-26 PROCEDURE — 96367 TX/PROPH/DG ADDL SEQ IV INF: CPT

## 2017-07-26 PROCEDURE — 86664 EPSTEIN-BARR NUCLEAR ANTIGEN: CPT

## 2017-07-26 PROCEDURE — 87046 STOOL CULTR AEROBIC BACT EA: CPT

## 2017-07-26 PROCEDURE — 700105 HCHG RX REV CODE 258: Performed by: HOSPITALIST

## 2017-07-26 PROCEDURE — 74177 CT ABD & PELVIS W/CONTRAST: CPT

## 2017-07-26 PROCEDURE — 87899 AGENT NOS ASSAY W/OPTIC: CPT

## 2017-07-26 PROCEDURE — 86665 EPSTEIN-BARR CAPSID VCA: CPT | Mod: 91

## 2017-07-26 PROCEDURE — 87045 FECES CULTURE AEROBIC BACT: CPT

## 2017-07-26 PROCEDURE — 700111 HCHG RX REV CODE 636 W/ 250 OVERRIDE (IP): Performed by: HOSPITALIST

## 2017-07-26 PROCEDURE — 85027 COMPLETE CBC AUTOMATED: CPT

## 2017-07-26 PROCEDURE — 76705 ECHO EXAM OF ABDOMEN: CPT

## 2017-07-26 PROCEDURE — 700101 HCHG RX REV CODE 250: Performed by: HOSPITALIST

## 2017-07-26 PROCEDURE — 700102 HCHG RX REV CODE 250 W/ 637 OVERRIDE(OP): Performed by: HOSPITALIST

## 2017-07-26 PROCEDURE — 80053 COMPREHEN METABOLIC PANEL: CPT

## 2017-07-26 PROCEDURE — 36415 COLL VENOUS BLD VENIPUNCTURE: CPT

## 2017-07-26 PROCEDURE — 770020 HCHG ROOM/CARE - TELE (206)

## 2017-07-26 PROCEDURE — 99233 SBSQ HOSP IP/OBS HIGH 50: CPT | Performed by: HOSPITALIST

## 2017-07-26 PROCEDURE — A9270 NON-COVERED ITEM OR SERVICE: HCPCS | Performed by: HOSPITALIST

## 2017-07-26 PROCEDURE — 78226 HEPATOBILIARY SYSTEM IMAGING: CPT

## 2017-07-26 PROCEDURE — 86663 EPSTEIN-BARR ANTIBODY: CPT

## 2017-07-26 PROCEDURE — 85007 BL SMEAR W/DIFF WBC COUNT: CPT

## 2017-07-26 RX ADMIN — POTASSIUM CHLORIDE AND SODIUM CHLORIDE: 900; 150 INJECTION, SOLUTION INTRAVENOUS at 01:52

## 2017-07-26 RX ADMIN — IOHEXOL 100 ML: 350 INJECTION, SOLUTION INTRAVENOUS at 10:00

## 2017-07-26 RX ADMIN — METRONIDAZOLE 500 MG: 500 INJECTION, SOLUTION INTRAVENOUS at 00:15

## 2017-07-26 RX ADMIN — ACETAMINOPHEN 650 MG: 325 TABLET, FILM COATED ORAL at 23:27

## 2017-07-26 RX ADMIN — PIPERACILLIN SODIUM AND TAZOBACTAM SODIUM 3.38 G: 3; .375 INJECTION, POWDER, FOR SOLUTION INTRAVENOUS at 11:59

## 2017-07-26 RX ADMIN — CEFTRIAXONE 2 G: 2 INJECTION, POWDER, FOR SOLUTION INTRAMUSCULAR; INTRAVENOUS at 08:52

## 2017-07-26 RX ADMIN — POTASSIUM CHLORIDE AND SODIUM CHLORIDE: 900; 150 INJECTION, SOLUTION INTRAVENOUS at 11:59

## 2017-07-26 RX ADMIN — PIPERACILLIN SODIUM AND TAZOBACTAM SODIUM 3.38 G: 3; .375 INJECTION, POWDER, FOR SOLUTION INTRAVENOUS at 23:27

## 2017-07-26 RX ADMIN — PIPERACILLIN SODIUM AND TAZOBACTAM SODIUM 3.38 G: 3; .375 INJECTION, POWDER, FOR SOLUTION INTRAVENOUS at 17:28

## 2017-07-26 RX ADMIN — IOHEXOL 50 ML: 240 INJECTION, SOLUTION INTRATHECAL; INTRAVASCULAR; INTRAVENOUS; ORAL at 10:00

## 2017-07-26 RX ADMIN — ASPIRIN 325 MG: 325 TABLET, DELAYED RELEASE ORAL at 08:52

## 2017-07-26 ASSESSMENT — COGNITIVE AND FUNCTIONAL STATUS - GENERAL
MOBILITY SCORE: 24
MOBILITY SCORE: 24
SUGGESTED CMS G CODE MODIFIER DAILY ACTIVITY: CH
SUGGESTED CMS G CODE MODIFIER MOBILITY: CH
SUGGESTED CMS G CODE MODIFIER MOBILITY: CH
SUGGESTED CMS G CODE MODIFIER DAILY ACTIVITY: CH
DAILY ACTIVITIY SCORE: 24
DAILY ACTIVITIY SCORE: 24

## 2017-07-26 ASSESSMENT — LIFESTYLE VARIABLES
EVER HAD A DRINK FIRST THING IN THE MORNING TO STEADY YOUR NERVES TO GET RID OF A HANGOVER: NO
TOTAL SCORE: 0
EVER_SMOKED: YES
EVER FELT BAD OR GUILTY ABOUT YOUR DRINKING: NO
HAVE PEOPLE ANNOYED YOU BY CRITICIZING YOUR DRINKING: NO
CONSUMPTION TOTAL: NEGATIVE
SUBSTANCE_ABUSE: 0
HOW MANY TIMES IN THE PAST YEAR HAVE YOU HAD 5 OR MORE DRINKS IN A DAY: 0
TOTAL SCORE: 0
ALCOHOL_USE: YES
AVERAGE NUMBER OF DAYS PER WEEK YOU HAVE A DRINK CONTAINING ALCOHOL: 4
TOTAL SCORE: 0
HAVE YOU EVER FELT YOU SHOULD CUT DOWN ON YOUR DRINKING: NO
ON A TYPICAL DAY WHEN YOU DRINK ALCOHOL HOW MANY DRINKS DO YOU HAVE: 1

## 2017-07-26 ASSESSMENT — ENCOUNTER SYMPTOMS
DIAPHORESIS: 0
HEADACHES: 0
DIZZINESS: 0
PALPITATIONS: 0
COUGH: 0
HEMOPTYSIS: 0
SORE THROAT: 0
FOCAL WEAKNESS: 0
FEVER: 1
CONSTIPATION: 0
MYALGIAS: 0
SHORTNESS OF BREATH: 0
LOSS OF CONSCIOUSNESS: 0
WEAKNESS: 0
BLOOD IN STOOL: 0
CHILLS: 1
CLAUDICATION: 0
EYE PAIN: 0
WHEEZING: 0
DEPRESSION: 0
SPUTUM PRODUCTION: 0
ABDOMINAL PAIN: 0
DIARRHEA: 1
SPEECH CHANGE: 0
NECK PAIN: 0
VOMITING: 0
BACK PAIN: 0
EYE DISCHARGE: 0
BRUISES/BLEEDS EASILY: 0
NAUSEA: 1
SENSORY CHANGE: 0

## 2017-07-26 ASSESSMENT — PAIN SCALES - GENERAL
PAINLEVEL_OUTOF10: 0

## 2017-07-26 ASSESSMENT — PATIENT HEALTH QUESTIONNAIRE - PHQ9
SUM OF ALL RESPONSES TO PHQ9 QUESTIONS 1 AND 2: 0
2. FEELING DOWN, DEPRESSED, IRRITABLE, OR HOPELESS: NOT AT ALL
1. LITTLE INTEREST OR PLEASURE IN DOING THINGS: NOT AT ALL
SUM OF ALL RESPONSES TO PHQ QUESTIONS 1-9: 0

## 2017-07-26 NOTE — ED PROVIDER NOTES
ED Provider Note    Scribed for Geovanny White M.D. by Tessa Barnes. 7/25/2017  6:58 PM    Primary care provider: No primary care provider on file.  Means of arrival: Walk-In  History obtained from: Patient  History limited by: None    CHIEF COMPLAINT  Chief Complaint   Patient presents with   • Fever     since Saturday. 102.2 in triage, reports taking tylenol 30 min ago.    • Dizziness       HPI  Geovanny Ibarra is a 72 y.o. male who presents to the ED for evaluation of fever, cold sweats and shaking spells onset Saturday while driving from [place] to [place]. Patient notes while walking to the car he got SOB which he believes is due to fatigue. Wife says this is new as he is under the care of a cardiologist and walks frequently and daily. Associated symptoms include diarrhea which has resolved and decreased PO intake over the past 2 days. Additionally, patient reports changing to a new blood pressure medication 1 week ago, right before his symptoms started. He stopped taking this medication on Sunday and notes his symtoms resolved but took the medication today with return of symptoms. Denies ever having these symptoms in the past. Denies leg swelling, blood clots, headache, cough, sinus pain, cath pain, sick contact, urinary urgency, urinary frequency, dysuria, rash, chest pain, abdominal pain. Patient confirms a past medical history of coronary stents and sinus infection. Denies a past medical history of asthma, diabetes or UTI. Confirms a past smoking history up until 2000. Confirms occasional alcohol use.       REVIEW OF SYSTEMS  Pertinent positives include: SOB, fatigue, fever, cold sweats, shaking, diarrhea, decreased PO intake.  Pertinent negatives include: leg swelling, blood clots, headache, cough, sinus pain, cath pain, sick contact, urinary urgency, urinary frequency, dysuria, rash, chest pain, abdominal pain.  10+ systems reviewed and negative.      PAST MEDICAL HISTORY  Denies      SOCIAL HISTORY  Social  "History   Substance Use Topics   • Smoking status: Never Smoker    • Smokeless tobacco: None   • Alcohol Use: No     History   Drug Use No       S  CURRENT MEDICATIONS  Current Facility-Administered Medications   Medication Dose Route Frequency Provider Last Rate Last Dose   • NS infusion 1,000 mL  1,000 mL Intravenous Once Geovanny White M.D.         No current outpatient prescriptions on file.       ALLERGIES  No Known Allergies    PHYSICAL EXAM  VITAL SIGNS: /83 mmHg  Pulse 98  Temp(Src) 37.2 °C (99 °F) (Oral)  Resp 20  Ht 1.727 m (5' 7.99\")  Wt 77.9 kg (171 lb 11.8 oz)  BMI 26.12 kg/m2  SpO2 93%  Reviewed and borderline blood pressure elevation    Constitutional: Well developed, Well nourished.  HENT: Normocephalic, atraumatic, bilateral external ears normal, oropharynx moist, No exudates or erythema.   Eyes: PERRLA, conjunctiva pink, no scleral icterus.   Cardiovascular: Regular rate and rhythm. No murmurs, rubs or gallops.   Respiratory: Lungs clear to auscultation bilaterally. No wheezes, rales, or rhonchi.   Gastrointestinal:  Abdomen soft, non-tender, non distended.   Skin: No erythema, no rash.   Genitourinary: No costovertebral angle tenderness.   Neurologic: Alert & oriented x 3, cranial nerves 2-12 intact by passive exam.  No focal deficit noted.  Psychiatric: Affect normal, Judgment normal, Mood normal.       DIFFERENTIAL DIAGNOSIS:  Pneumonia, UTI, viral syndrome, cholecystitis, hepatitis    RADIOLOGY/PROCEDURES  DX-CHEST-PORTABLE (1 VIEW)   Final Result         1. No acute cardiopulmonary abnormalities are identified.      US-GALLBLADDER    (Results Pending)       LABORATORY:  Results for orders placed or performed during the hospital encounter of 07/25/17   Lactic acid (lactate)   Result Value Ref Range    Lactic Acid 1.5 0.5 - 2.0 mmol/L   Lactic acid (lactate)   Result Value Ref Range    Lactic Acid 1.3 0.5 - 2.0 mmol/L   CBC WITH DIFFERENTIAL   Result Value Ref Range    WBC 6.6 4.8 - " 10.8 K/uL    RBC 4.01 (L) 4.70 - 6.10 M/uL    Hemoglobin 12.3 (L) 14.0 - 18.0 g/dL    Hematocrit 33.6 (L) 42.0 - 52.0 %    MCV 83.8 81.4 - 97.8 fL    MCH 30.7 27.0 - 33.0 pg    MCHC 36.6 (H) 33.7 - 35.3 g/dL    RDW 38.8 35.9 - 50.0 fL    Platelet Count 135 (L) 164 - 446 K/uL    MPV 10.0 9.0 - 12.9 fL    Neutrophils-Polys 85.50 (H) 44.00 - 72.00 %    Lymphocytes 3.80 (L) 22.00 - 41.00 %    Monocytes 7.30 0.00 - 13.40 %    Eosinophils 2.30 0.00 - 6.90 %    Basophils 0.50 0.00 - 1.80 %    Immature Granulocytes 0.60 0.00 - 0.90 %    Nucleated RBC 0.00 /100 WBC    Neutrophils (Absolute) 5.64 1.82 - 7.42 K/uL    Lymphs (Absolute) 0.25 (L) 1.00 - 4.80 K/uL    Monos (Absolute) 0.48 0.00 - 0.85 K/uL    Eos (Absolute) 0.15 0.00 - 0.51 K/uL    Baso (Absolute) 0.03 0.00 - 0.12 K/uL    Immature Granulocytes (abs) 0.04 0.00 - 0.11 K/uL    NRBC (Absolute) 0.00 K/uL   COMP METABOLIC PANEL   Result Value Ref Range    Sodium 134 (L) 135 - 145 mmol/L    Potassium 2.7 (LL) 3.6 - 5.5 mmol/L    Chloride 99 96 - 112 mmol/L    Co2 22 20 - 33 mmol/L    Anion Gap 13.0 (H) 0.0 - 11.9    Glucose 131 (H) 65 - 99 mg/dL    Bun 16 8 - 22 mg/dL    Creatinine 0.95 0.50 - 1.40 mg/dL    Calcium 8.9 8.5 - 10.5 mg/dL    AST(SGOT) 80 (H) 12 - 45 U/L    ALT(SGPT) 79 (H) 2 - 50 U/L    Alkaline Phosphatase 140 (H) 30 - 99 U/L    Total Bilirubin 1.6 (H) 0.1 - 1.5 mg/dL    Albumin 4.0 3.2 - 4.9 g/dL    Total Protein 6.9 6.0 - 8.2 g/dL    Globulin 2.9 1.9 - 3.5 g/dL    A-G Ratio 1.4 g/dL   URINALYSIS   Result Value Ref Range    Color Yellow     Character Clear     Specific Gravity 1.010 <1.035    Ph 6.5 5.0-8.0    Glucose Negative Negative mg/dL    Ketones Negative Negative mg/dL    Protein Negative Negative mg/dL    Bilirubin Negative Negative    Urobilinogen, Urine 1.0 Negative    Nitrite Negative Negative    Leukocyte Esterase Negative Negative    Occult Blood Negative Negative    Micro Urine Req see below    HEPATITIS PANEL ACUTE(4 COMPONENTS)   Result  Value Ref Range    Hepatitis B Surface Antigen Negative Negative    Hepatitis C Antibody Negative Negative    Hepatitis B Cors Ab,IgM Negative Negative    Hepatitis A Virus Ab, IgM Negative Negative       INTERVENTIONS: Dehydration  Medications   NS infusion 1,000 mL (not administered)   NS infusion 1,000 mL (0 mL Intravenous Stopped 7/25/17 2103)   potassium chloride in water (KCL) ivpb 10 mEq (0 mEq Intravenous Stopped 7/25/17 2103)   potassium chloride SA (Kdur) tablet 40 mEq (40 mEq Oral Given 7/25/17 2003)   cefTRIAXone (ROCEPHIN) injection 1 g (1 g Intravenous Given 7/25/17 2105)     Response: Improvement in alertness.    COURSE & MEDICAL DECISION MAKING    6:58 PM - Patient seen and examined at bedside. Informed patient that his liver enzymes are low as well as his potassium. Patient states drinking plenty of fluids and eating a banana. Patient will be treated with IV fluids for his symptoms. Ordered DX-chest, lactic acid, CBC with differential, CMP, urinalysis, urine culture, blood culture, estimated GFR to evaluate.     11:04 PM - Recheck: Patient feels better after given IV fluids and antibiotics. Discussed results and further testing for gallbladder disease.     This patient presents with fever confusion diarrhea hypokalemia and elevation of liver enzymes. Otherwise the patient's labs were reassuringly normal. I'm concerned he may have cholecystitis without significant abdominal pain. Less likely has an intra-abdominal abscess or sinusitis. Viral syndrome is still possible given his confusion I think he needs to be admitted on IV antibiotics pending gallbladder ultrasound and possibly abdominal CT. Case discussed with Dr. Grover hospitalist.    PLAN:  Admission for workup for fever and confusion, IV antibiotics pending culture results, gallbladder ultrasound and possible abdominal CT. Possible surgical consultation    CONDITION: Fair.    FINAL IMPRESSION  1. Acute febrile illness    2. Altered mental  status, unspecified altered mental status type    3. Elevated liver enzymes          Electronically signed by: Tessa Barnes, 7/25/2017 6:58 PM    The note accurately reflects work and decisions made by me.  Geovanny White  7/26/2017  7:06 PM

## 2017-07-26 NOTE — H&P
Hospital Medicine History and Physical    Date of Service  7/26/2017    Chief Complaint  Chief Complaint   Patient presents with   • Fever     since Saturday. 102.2 in triage, reports taking tylenol 30 min ago.    • Dizziness       History of Presenting Illness  72 y.o. male who presented 7/25/2017 with  fever, cold sweats and shaking spells onset Saturday while driving from [place] to [place]. Patient notes while walking to the car he got SOB which he believes is due to fatigue. Wife says this is new as he is under the care of a cardiologist and walks frequently and daily. Associated symptoms include diarrhea which has resolved and decreased PO intake over the past 2 days. Additionally, patient reports changing to a new blood pressure medication, hyralazine. He stopped taking this medication on Sunday and notes his symtoms resolved but took the medication today with return of symptoms. Denies ever having these symptoms in the past. No recent travel.  No neck pain or stiffness. Fever at home up to 104.5 orally.    Primary Care Physician  [facility]    Consultants  none    Code Status  Full code    Review of Systems  Review of Systems   Constitutional: Positive for fever, chills and malaise/fatigue. Negative for diaphoresis.   HENT: Negative for congestion and sore throat.    Eyes: Negative for pain and discharge.   Respiratory: Negative for cough, hemoptysis, sputum production, shortness of breath and wheezing.    Cardiovascular: Negative for chest pain, palpitations, claudication and leg swelling.   Gastrointestinal: Positive for nausea and diarrhea. Negative for vomiting, abdominal pain, constipation, blood in stool and melena.   Genitourinary: Negative for dysuria, urgency and frequency.   Musculoskeletal: Negative for myalgias, back pain, joint pain and neck pain.   Skin: Negative for itching and rash.   Neurological: Negative for dizziness, sensory change, speech change, focal weakness, loss of consciousness, weakness  and headaches.   Endo/Heme/Allergies: Does not bruise/bleed easily.   Psychiatric/Behavioral: Negative for depression, suicidal ideas and substance abuse.          Past Medical History  Osteoarthritis  CAD with stent  HTN  HLD    Surgical History  Appendectomy 14 years old  Rt knee arthroscopy  Cardiac stent x1   No colonoscopies.    Medications  hyzaar 100-25 daily  hydraazine 25 bid  crestor 10mg daily  Niacin Sr 500mg daily  mobic 15 mg daily  Asa 325 mg daily    Family History  Mother  of MI 80s, had lap sourav  Father  age 39yo of [place]  Paternal grandfather  of [place]    Social History  Quit smoking   Former 1/2 PPD  1-2 drinks alcohol on occasion  Lives with wife  Travelled from [place] to [place] to visit daughter    Allergies  No Known Allergies     Physical Exam  Laboratory   Hemodynamics  Temp (24hrs), Av.5 °C (99.5 °F), Min:36.6 °C (97.8 °F), Max:39 °C (102.2 °F)   Temperature: 36.6 °C (97.8 °F)  Pulse  Av.1  Min: 63  Max: 98 Heart Rate (Monitored): 65  Blood Pressure : 143/58 mmHg, NIBP: 143/83 mmHg      Respiratory      Respiration: 16, Pulse Oximetry: 95 %             Physical Exam   Constitutional: He is oriented to person, place, and time. He appears well-developed and well-nourished. No distress.   HENT:   Head: Normocephalic and atraumatic.   Mouth/Throat: Oropharynx is clear and moist. No oropharyngeal exudate.   Eyes: Conjunctivae and EOM are normal. Pupils are equal, round, and reactive to light. Right eye exhibits no discharge. Left eye exhibits no discharge. No scleral icterus.   Neck: Normal range of motion. Neck supple. No JVD present. No tracheal deviation present. No thyromegaly present.   Cardiovascular: Normal rate, regular rhythm and normal heart sounds.  Exam reveals no gallop and no friction rub.    No murmur heard.  Pulmonary/Chest: Effort normal and breath sounds normal. No respiratory distress. He has no wheezes. He has no rales. He exhibits no tenderness.    Abdominal: Soft. Bowel sounds are normal. He exhibits no distension and no mass. There is no tenderness. There is no rebound and no guarding.   nontender all 4 quadrants, soft to palpation, no distention or rebound.   Musculoskeletal: Normal range of motion. He exhibits no edema or tenderness.   Lymphadenopathy:     He has no cervical adenopathy.   Neurological: He is alert and oriented to person, place, and time. No cranial nerve deficit. He exhibits normal muscle tone.   No meningeal signs of nuchal rigidity or pain with flexion chin to chest   Skin: Skin is warm and dry. No rash noted. He is not diaphoretic. No erythema.   Psychiatric: He has a normal mood and affect. His behavior is normal. Judgment and thought content normal.   Nursing note and vitals reviewed.      Recent Labs      07/25/17   1813  07/26/17   0144   WBC  6.6  4.3*   RBC  4.01*  3.81*   HEMOGLOBIN  12.3*  11.3*   HEMATOCRIT  33.6*  33.2*   MCV  83.8  87.1   MCH  30.7  29.7   MCHC  36.6*  34.0   RDW  38.8  41.3   PLATELETCT  135*  116*   MPV  10.0  10.1     Recent Labs      07/25/17   1813  07/26/17   0144   SODIUM  134*  138   POTASSIUM  2.7*  3.9   CHLORIDE  99  104   CO2  22  25   GLUCOSE  131*  116*   BUN  16  14   CREATININE  0.95  0.90   CALCIUM  8.9  8.4*     Recent Labs      07/25/17   1813  07/26/17   0144   ALTSGPT  79*  120*   ASTSGOT  80*  172*   ALKPHOSPHAT  140*  166*   TBILIRUBIN  1.6*  1.8*   LIPASE  35   --    GLUCOSE  131*  116*                 No results found for: TROPONINI    Imaging  Pending  U/S GB and CT abd/pelvis   Assessment/Plan     I anticipate this patient will require at least two midnights for appropriate medical management, necessitating inpatient admission.    * Fever and chills (present on admission)  Assessment & Plan  Unclear etiology  CXR negative  ?acute gastroenteritis, check CT abd/pelvis, u/s BG  BCs x2 ordered.  NS 1 L and IVF maintenance     Anorexia (present on admission)  Assessment & Plan  States  unable to eat since no appetite, no emesis, decreased appetite x 3 days.    Diarrhea (present on admission)  Assessment & Plan  Episode of loose stool PTA, known since stopped eating.  No recent antibiotics.  Stool culture.    Transaminitis (present on admission)  Assessment & Plan  Elevated in non daily alcohol drinker  Check u/s liver and CT abd/pelvis with contrast  Unclear if related to transaminitis  Hepatitis negative  Rocephin IV, flagyl IV until results are known.    Hypokalemia due to loss of potassium (present on admission)  Assessment & Plan  Admission 2.7, due to diarrheal losses and poor appetite  Replaced with IV 10 meq x1 in ER and KDur 40meq po x1  Ordered NS plus 20 k at 125/hr.  Repeat BMP.        VTE prophylaxis: heparin, SCDs.

## 2017-07-26 NOTE — PROGRESS NOTES
Assumed care of patient at 0715, received bedside report from night shift RN. Bed is locked and in lowest position with call light within reach. Patient updated on plan of care, no complaints or pain at this time. White board updated. Assessment completed. Pt A&Ox4. Tele monitor in place and cardiac rhythm being monitored. All needs met at this time.

## 2017-07-26 NOTE — PROGRESS NOTES
[facility] Hospitalist Progress Note    Date of Service: 2017    Chief Complaint  72 y.o. male admitted 2017 with severe sepsis, diarrhea, fever up to 104.5, transaminitis. U/s GB and CT abdomen/pelvis normal so far. H/o diarrhea up until anorexia set in. No recent abx use.    Interval Problem Update  Feeling a little improved. He has had no fevers or emesis or diarrhea since admission    Consultants/Specialty  None    Disposition  None    Us GB benign.   Ct pending.       ROS   Physical Exam  Laboratory/Imaging   Hemodynamics  Temp (24hrs), Av.5 °C (99.5 °F), Min:36.6 °C (97.8 °F), Max:39 °C (102.2 °F)   Temperature: 36.6 °C (97.8 °F)  Pulse  Av.1  Min: 63  Max: 98 Heart Rate (Monitored): 65  Blood Pressure : 143/58 mmHg, NIBP: 143/83 mmHg      Respiratory      Respiration: 16, Pulse Oximetry: 95 %             Fluids  No intake or output data in the 24 hours ending 17 0812    Nutrition  Orders Placed This Encounter   Procedures   • Diet Order     Standing Status: Standing      Number of Occurrences: 1      Standing Expiration Date:      Order Specific Question:  Diet:     Answer:  Clear Liquid [10]     Physical Exam    Recent Labs      17   1813  17   0144   WBC  6.6  4.3*   RBC  4.01*  3.81*   HEMOGLOBIN  12.3*  11.3*   HEMATOCRIT  33.6*  33.2*   MCV  83.8  87.1   MCH  30.7  29.7   MCHC  36.6*  34.0   RDW  38.8  41.3   PLATELETCT  135*  116*   MPV  10.0  10.1     Recent Labs      17   1813  17   0144   SODIUM  134*  138   POTASSIUM  2.7*  3.9   CHLORIDE  99  104   CO2  22  25   GLUCOSE  131*  116*   BUN  16  14   CREATININE  0.95  0.90   CALCIUM  8.9  8.4*                      Assessment/Plan     * Fever and chills (present on admission)  Assessment & Plan  So far most consistent with acute gastroenteritis, negative CT abd/pelvis, negative u/s BG  BCs x2 pending.  Check HIDA scan    Anorexia (present on admission)  Assessment & Plan  Likely related to  infection    Diarrhea (present on admission)  Assessment & Plan  No fecal leukocytes otherwise negative workup so far. I suspect this is a viral gastroenteritis but not clear yet    Transaminitis (present on admission)  Assessment & Plan  Elevated in non daily alcohol drinker  Normal u/s liver and only splenomegaly seen on CT abd/pelvis with contrast  Hepatitis negative  Rocephin IV, flagyl IV to continue empirically for now and will check a EBV titer    Hypokalemia due to loss of potassium (present on admission)  Assessment & Plan  Admission 2.7, due to diarrheal losses and poor appetite  Continue to monitor and replace    Thrombocytopenia (CMS-HCC)  Assessment & Plan  Possibly related to infection we'll continue to trend    Anemia  Assessment & Plan  Check iron studies possibly reactive      Core Measures

## 2017-07-26 NOTE — CARE PLAN
Problem: Communication  Goal: The ability to communicate needs accurately and effectively will improve  Intervention: Educate patient and significant other/support system about the plan of care, procedures, treatments, medications and allow for questions  Discussed plan of care for the day and and the different tests ordered.

## 2017-07-26 NOTE — CARE PLAN
[place] Fall Risk Assessment:     Last Known Fall: No falls  Mobility: No limitations  Medications: No meds  Mental Status/LOC/Awareness: Awake, alert, and oriented to date, place, and person  Toileting Needs: No needs  Volume/Electrolyte Status: Use of IV fluids/tube feeds  Communication/Sensory: Visual (Glasses)/hearing deficit  Behavior: Appropriate behavior  [place] Fall Risk Total: 6  Fall Risk Level: NO RISK    Universal Fall Precautions:  call light/belongings in reach, bed in low position and locked, siderails up x 2, use non-slip footwear, adequate lighting, clutter free and spill free environment, educate on level of risk, educate to call for assistance    Fall Risk Level Interventions:          Patient Specific Interventions:     Medication: review medications with patient and family  Mental Status/LOC/Awareness: reinforce falls education, check on patient hourly, utilize bed/chair fall alarm and reinforce the use of call light  Toileting: monitor intake and output/use of appropriate interventions, instruct patient/family on the need to call for assistance when toileting and do not leave patient unattended in bathroom/refer to toileting scripting  Volume/Electrolyte Status: ensure patient remains hydrated, monitor abnormal lab values and ensure IV fluids are appropriate  Communication/Sensory: update plan of care on whiteboard, ensure proper positioning when transferrng/ambulating and ensure patient has glasses/contacts and hearing aids/dentures  Behavioral: administer medication as ordered  Mobility: utilize bed/chair fall alarm and ensure bed is locked and in lowest position

## 2017-07-26 NOTE — ASSESSMENT & PLAN NOTE
So far most consistent with acute gastroenteritis, he was up to 101 again last night but is feeling better. Negative CT abd/pelvis, negative u/s BG  BCs x2 pending.  Normal HIDA scan  Thought to be due to viral gastroenteritis at this point

## 2017-07-26 NOTE — ASSESSMENT & PLAN NOTE
No fecal leukocytes otherwise negative workup so far. I suspect this is a viral gastroenteritis but not clear yet. It appears to be improving

## 2017-07-26 NOTE — PROGRESS NOTES
Pt arrived from from ED awake alert and oriented with no c/o pain. VSS. Telemetry monitor placed and verified by the monitor, SR 75. Pt educated to POC and unit policies. Pt verbalized understanding. Bed locked in low position with fall precautions in place. Call light in place with bed side table with in reach.

## 2017-07-26 NOTE — CARE PLAN
Problem: Safety  Goal: Will remain free from injury  Intervention: Provide assistance with mobility  Pt oriented to room and fall precautions. Pt ambulates ad mark.      Problem: Knowledge Deficit  Goal: Knowledge of disease process/condition, treatment plan, diagnostic tests, and medications will improve  Intervention: Explain information regarding disease process/condition, treatment plan, diagnostic tests, and medications and document in education  Pt educated to current results r/t current state of health. Pt verbalized understanding.

## 2017-07-26 NOTE — DOCUMENTATION QUERY
DOCUMENTATION QUERY    PROVIDERS: Please select “Cosign w/ note”to reply to query.    To better represent the severity of illness of your patient, please review the following information and exercise your independent professional judgment in responding to this query.     7/26/17 progress note documents: 72 y.o. male admitted 7/25/2017 with severe sepsis, diarrhea, fever up to 104.5, transaminitis. U/s GB and CT abdomen/pelvis normal so far. H/o diarrhea up until anorexia set in. No recent abx use.      Based upon the clinical findings, risk factors, and treatment, please specify if severe sepsis was present on admission or developed after admission with specific associated organ dysfunction/failure or whether sepsis has been ruled out.       Please select all that apply:   • Sepsis present on admission (specify causative organism if known and any associated organ dysfunction if known)  • Sepsis developed after admission  • Sepsis has been ruled out  • Severe sepsis present on admission  (specify causative organism if known and any associated organ dysfunction if known)  • Severe sepsis developed after admission   • Severe sepsis has been ruled out   • SIRS that is unrelated to any infection   • SIRS of non-infectious origin with acute organ dysfunction  • Other explanation of clinical findings  • Findings of no clinical significance  • Unable to determine      The medical record reflects the following:   Clinical Findings Severe sepsis is documented in the progress note with no specification of organ failure/dysfunction   Sepsis/severe sepsis is not documented in the H&P  Admit vitals: T 102.2, RR 18, /83, HR 98  Admit labs: WBC 6.6, lactic acid 1.5  Fever and chills with unclear etiology    Treatment Blood cultures   Sepsis protocol  IVF   antibiotics   Risk Factors Advanced age   Hypertension   Heart disease   Anorexia   Diarrhea   transaminitis  hypokalemia   Location within medical record History and  Physical, Progress Notes and Lab Results      Thank you,   Roxana Michelle RN  Clinical   [phone]

## 2017-07-27 PROBLEM — D50.9 IRON DEFICIENCY ANEMIA: Status: ACTIVE | Noted: 2017-07-26

## 2017-07-27 LAB
ALBUMIN SERPL BCP-MCNC: 3.2 G/DL (ref 3.2–4.9)
ALBUMIN/GLOB SERPL: 1.3 G/DL
ALP SERPL-CCNC: 250 U/L (ref 30–99)
ALT SERPL-CCNC: 136 U/L (ref 2–50)
ANION GAP SERPL CALC-SCNC: 8 MMOL/L (ref 0–11.9)
ANISOCYTOSIS BLD QL SMEAR: ABNORMAL
AST SERPL-CCNC: 117 U/L (ref 12–45)
BACTERIA UR CULT: NORMAL
BASOPHILS # BLD AUTO: 0.9 % (ref 0–1.8)
BASOPHILS # BLD: 0.05 K/UL (ref 0–0.12)
BILIRUB SERPL-MCNC: 1.3 MG/DL (ref 0.1–1.5)
BUN SERPL-MCNC: 10 MG/DL (ref 8–22)
CALCIUM SERPL-MCNC: 8.3 MG/DL (ref 8.5–10.5)
CHLORIDE SERPL-SCNC: 108 MMOL/L (ref 96–112)
CO2 SERPL-SCNC: 23 MMOL/L (ref 20–33)
CREAT SERPL-MCNC: 0.77 MG/DL (ref 0.5–1.4)
E COLI SXT1+2 STL IA: NORMAL
EBV EA-D IGG SER-ACNC: <5 U/ML (ref 0–10.9)
EBV NA IGG SER IA-ACNC: 180 U/ML (ref 0–21.9)
EBV VCA IGG SER IA-ACNC: 196 U/ML (ref 0–21.9)
EBV VCA IGM SER IA-ACNC: <10 U/ML (ref 0–43.9)
EOSINOPHIL # BLD AUTO: 0.26 K/UL (ref 0–0.51)
EOSINOPHIL NFR BLD: 5.2 % (ref 0–6.9)
ERYTHROCYTE [DISTWIDTH] IN BLOOD BY AUTOMATED COUNT: 41.6 FL (ref 35.9–50)
GFR SERPL CREATININE-BSD FRML MDRD: >60 ML/MIN/1.73 M 2
GLOBULIN SER CALC-MCNC: 2.5 G/DL (ref 1.9–3.5)
GLUCOSE SERPL-MCNC: 124 MG/DL (ref 65–99)
HCT VFR BLD AUTO: 30.7 % (ref 42–52)
HGB BLD-MCNC: 10.5 G/DL (ref 14–18)
IRON SATN MFR SERPL: 11 % (ref 15–55)
IRON SERPL-MCNC: 26 UG/DL (ref 50–180)
LYMPHOCYTES # BLD AUTO: 0.39 K/UL (ref 1–4.8)
LYMPHOCYTES NFR BLD: 7.8 % (ref 22–41)
MANUAL DIFF BLD: NORMAL
MCH RBC QN AUTO: 29.7 PG (ref 27–33)
MCHC RBC AUTO-ENTMCNC: 34.2 G/DL (ref 33.7–35.3)
MCV RBC AUTO: 86.7 FL (ref 81.4–97.8)
MICROCYTES BLD QL SMEAR: ABNORMAL
MONOCYTES # BLD AUTO: 0.31 K/UL (ref 0–0.85)
MONOCYTES NFR BLD AUTO: 6.1 % (ref 0–13.4)
MORPHOLOGY BLD-IMP: NORMAL
NEUTROPHILS # BLD AUTO: 4 K/UL (ref 1.82–7.42)
NEUTROPHILS NFR BLD: 80 % (ref 44–72)
NRBC # BLD AUTO: 0 K/UL
NRBC BLD AUTO-RTO: 0 /100 WBC
PLATELET # BLD AUTO: 110 K/UL (ref 164–446)
PLATELET BLD QL SMEAR: NORMAL
PMV BLD AUTO: 10.3 FL (ref 9–12.9)
POTASSIUM SERPL-SCNC: 3.7 MMOL/L (ref 3.6–5.5)
PROT SERPL-MCNC: 5.7 G/DL (ref 6–8.2)
RBC # BLD AUTO: 3.54 M/UL (ref 4.7–6.1)
RBC BLD AUTO: PRESENT
SIGNIFICANT IND 70042: NORMAL
SIGNIFICANT IND 70042: NORMAL
SITE SITE: NORMAL
SITE SITE: NORMAL
SODIUM SERPL-SCNC: 139 MMOL/L (ref 135–145)
SOURCE SOURCE: NORMAL
SOURCE SOURCE: NORMAL
TIBC SERPL-MCNC: 228 UG/DL (ref 250–450)
WBC # BLD AUTO: 5 K/UL (ref 4.8–10.8)

## 2017-07-27 PROCEDURE — 700102 HCHG RX REV CODE 250 W/ 637 OVERRIDE(OP): Performed by: HOSPITALIST

## 2017-07-27 PROCEDURE — 700111 HCHG RX REV CODE 636 W/ 250 OVERRIDE (IP): Performed by: HOSPITALIST

## 2017-07-27 PROCEDURE — 36415 COLL VENOUS BLD VENIPUNCTURE: CPT

## 2017-07-27 PROCEDURE — A9270 NON-COVERED ITEM OR SERVICE: HCPCS | Performed by: HOSPITALIST

## 2017-07-27 PROCEDURE — 770020 HCHG ROOM/CARE - TELE (206)

## 2017-07-27 PROCEDURE — 700101 HCHG RX REV CODE 250: Performed by: HOSPITALIST

## 2017-07-27 PROCEDURE — 80053 COMPREHEN METABOLIC PANEL: CPT

## 2017-07-27 PROCEDURE — 700105 HCHG RX REV CODE 258: Performed by: HOSPITALIST

## 2017-07-27 PROCEDURE — 85007 BL SMEAR W/DIFF WBC COUNT: CPT

## 2017-07-27 PROCEDURE — 83540 ASSAY OF IRON: CPT

## 2017-07-27 PROCEDURE — 83550 IRON BINDING TEST: CPT

## 2017-07-27 PROCEDURE — 99233 SBSQ HOSP IP/OBS HIGH 50: CPT | Performed by: HOSPITALIST

## 2017-07-27 PROCEDURE — 85027 COMPLETE CBC AUTOMATED: CPT

## 2017-07-27 RX ORDER — FERROUS GLUCONATE 324(38)MG
324 TABLET ORAL 2 TIMES DAILY WITH MEALS
Status: DISCONTINUED | OUTPATIENT
Start: 2017-07-27 | End: 2017-07-28 | Stop reason: HOSPADM

## 2017-07-27 RX ADMIN — HEPARIN SODIUM 5000 UNITS: 5000 INJECTION, SOLUTION INTRAVENOUS; SUBCUTANEOUS at 21:45

## 2017-07-27 RX ADMIN — ASPIRIN 325 MG: 325 TABLET, DELAYED RELEASE ORAL at 08:40

## 2017-07-27 RX ADMIN — PIPERACILLIN SODIUM AND TAZOBACTAM SODIUM 3.38 G: 3; .375 INJECTION, POWDER, FOR SOLUTION INTRAVENOUS at 17:45

## 2017-07-27 RX ADMIN — PIPERACILLIN SODIUM AND TAZOBACTAM SODIUM 3.38 G: 3; .375 INJECTION, POWDER, FOR SOLUTION INTRAVENOUS at 05:46

## 2017-07-27 RX ADMIN — PIPERACILLIN SODIUM AND TAZOBACTAM SODIUM 3.38 G: 3; .375 INJECTION, POWDER, FOR SOLUTION INTRAVENOUS at 23:59

## 2017-07-27 RX ADMIN — POTASSIUM CHLORIDE AND SODIUM CHLORIDE: 900; 150 INJECTION, SOLUTION INTRAVENOUS at 05:46

## 2017-07-27 RX ADMIN — FERROUS GLUCONATE 324 MG: 324 TABLET ORAL at 08:40

## 2017-07-27 RX ADMIN — FERROUS GLUCONATE 324 MG: 324 TABLET ORAL at 17:42

## 2017-07-27 RX ADMIN — PIPERACILLIN SODIUM AND TAZOBACTAM SODIUM 3.38 G: 3; .375 INJECTION, POWDER, FOR SOLUTION INTRAVENOUS at 13:19

## 2017-07-27 RX ADMIN — POTASSIUM CHLORIDE AND SODIUM CHLORIDE: 900; 150 INJECTION, SOLUTION INTRAVENOUS at 19:20

## 2017-07-27 ASSESSMENT — ENCOUNTER SYMPTOMS
FEVER: 0
DEPRESSION: 0
TINGLING: 0
CHILLS: 0
NAUSEA: 1
SHORTNESS OF BREATH: 0
PALPITATIONS: 0
COUGH: 0
SORE THROAT: 0
NECK PAIN: 0
VOMITING: 0
HEADACHES: 0
BLURRED VISION: 0
EYE PAIN: 0
DIARRHEA: 1
DIZZINESS: 0
BACK PAIN: 0
ABDOMINAL PAIN: 0
INSOMNIA: 0

## 2017-07-27 ASSESSMENT — PAIN SCALES - GENERAL: PAINLEVEL_OUTOF10: 0

## 2017-07-27 NOTE — CARE PLAN
Problem: Knowledge Deficit  Goal: Knowledge of disease process/condition, treatment plan, diagnostic tests, and medications will improve  Intervention: Explain information regarding disease process/condition, treatment plan, diagnostic tests, and medications and document in education  Reviewed current POC with pt r/t exams done and lab trends. Pt verbalized understanding.       Problem: Discharge Barriers/Planning  Goal: Patient’s continuum of care needs will be met  Intervention: Involve patient and significant other/support system in setting and prioritizing goals for hospital stay and discharge  Pt educated to expected nights r/t current condition. Pt verbalized understanding.

## 2017-07-27 NOTE — PROGRESS NOTES
[facility]    Date of Service: 2017    Chief Complaint  72 y.o. male admitted 2017 with severe sepsis, diarrhea, fever up to 104.5, transaminitis. U/s GB and CT abdomen/pelvis normal so far. H/o diarrhea up until anorexia set in. No recent abx use.    Interval Problem Update  Feeling a little improved. He has had no fevers or emesis or diarrhea since admission    fe  Unremarkable hepatobiliary scan.  Fever to 101      Consultants/Specialty  None    Disposition  None    Us GB benign.   Ct pending.       Review of Systems   Constitutional: Negative for fever and chills.   HENT: Negative for sore throat.    Eyes: Negative for blurred vision and pain.   Respiratory: Negative for cough and shortness of breath.    Cardiovascular: Negative for chest pain and palpitations.   Gastrointestinal: Positive for nausea and diarrhea. Negative for vomiting and abdominal pain.   Genitourinary: Negative for dysuria and urgency.   Musculoskeletal: Negative for back pain and neck pain.   Skin: Negative for itching and rash.   Neurological: Negative for dizziness, tingling and headaches.   Psychiatric/Behavioral: Negative for depression. The patient does not have insomnia.    All other systems reviewed and are negative.     Physical Exam  Laboratory/Imaging   Hemodynamics  Temp (24hrs), Av.4 °C (99.4 °F), Min:36.9 °C (98.4 °F), Max:38.8 °C (101.9 °F)   Temperature: 37.2 °C (99 °F)  Pulse  Av.7  Min: 53  Max: 98    Blood Pressure : 147/73 mmHg      Respiratory      Respiration: 15, Pulse Oximetry: 97 %             Fluids    Intake/Output Summary (Last 24 hours) at 17 0800  Last data filed at 17 2110   Gross per 24 hour   Intake    400 ml   Output      0 ml   Net    400 ml       Nutrition  Orders Placed This Encounter   Procedures   • DIET ORDER     Standing Status: Standing      Number of Occurrences: 1      Standing Expiration Date:      Order Specific Question:  Diet:     Answer:   Regular [1]     Physical Exam   Constitutional: He is oriented to person, place, and time. He appears well-developed and well-nourished. No distress.   HENT:   Right Ear: External ear normal.   Left Ear: External ear normal.   Nose: Nose normal.   Eyes: Right eye exhibits no discharge. Left eye exhibits no discharge. No scleral icterus.   Neck: No JVD present. No tracheal deviation present.   Cardiovascular: Normal rate, normal heart sounds and intact distal pulses.    No murmur heard.  Pulmonary/Chest: Effort normal and breath sounds normal. No respiratory distress. He has no wheezes. He has no rales.   Abdominal: Soft. Bowel sounds are normal. He exhibits no distension. There is tenderness. There is no guarding.   Musculoskeletal: He exhibits no edema or tenderness.   Neurological: He is alert and oriented to person, place, and time.   Skin: Skin is warm and dry. He is not diaphoretic. No erythema.   Psychiatric: He has a normal mood and affect. His behavior is normal.   Nursing note and vitals reviewed.      Recent Labs      07/25/17   1813  07/26/17   0144  07/27/17   0020   WBC  6.6  4.3*  5.0   RBC  4.01*  3.81*  3.54*   HEMOGLOBIN  12.3*  11.3*  10.5*   HEMATOCRIT  33.6*  33.2*  30.7*   MCV  83.8  87.1  86.7   MCH  30.7  29.7  29.7   MCHC  36.6*  34.0  34.2   RDW  38.8  41.3  41.6   PLATELETCT  135*  116*  110*   MPV  10.0  10.1  10.3     Recent Labs      07/25/17   1813  07/26/17   0144  07/27/17   0019   SODIUM  134*  138  139   POTASSIUM  2.7*  3.9  3.7   CHLORIDE  99  104  108   CO2  22  25  23   GLUCOSE  131*  116*  124*   BUN  16  14  10   CREATININE  0.95  0.90  0.77   CALCIUM  8.9  8.4*  8.3*                      Assessment/Plan     * Fever and chills (present on admission)  Assessment & Plan  So far most consistent with acute gastroenteritis, he was up to 101 again last night but is feeling better. Negative CT abd/pelvis, negative u/s BG  BCs x2 pending.  Normal HIDA scan  Thought to be due to viral  gastroenteritis at this point    Anorexia (present on admission)  Assessment & Plan  Likely related to infection    Diarrhea (present on admission)  Assessment & Plan  No fecal leukocytes otherwise negative workup so far. I suspect this is a viral gastroenteritis but not clear yet. It appears to be improving    Transaminitis (present on admission)  Assessment & Plan  Elevated in non daily alcohol drinker  Normal u/s liver and only splenomegaly seen on CT abd/pelvis with contrast  Hepatitis negative, normal HIDA scan. 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       Hypokalemia due to loss of potassium (present on admission)  Assessment & Plan  Admission 2.7, due to diarrheal losses and poor appetite  Continue to monitor and replace    Thrombocytopenia (CMS-HCC)  Assessment & Plan  Worse today still presumed to be reactive we will trend this    Iron deficiency anemia  Assessment & Plan  Start replacement.      EKG reviewed, Labs reviewed, Medications reviewed and Radiology images reviewed  Rivera catheter: No Rivera      DVT Prophylaxis: Heparin    Ulcer prophylaxis: Not indicated  Antibiotics: Treating active infection/contamination beyond 24 hours perioperative coverage  Assessed for rehab: Patient was assess for and/or received rehabilitation services during this hospitalization

## 2017-07-27 NOTE — PROGRESS NOTES
Received report from night shift RN. Assumed care of patient. Patient is SR on the monitor. Patient is sitting in bed with no family present at this time. Patient declines any needs at this time. Plan of care discussed with patient. Patient has call light within reach

## 2017-07-27 NOTE — PROGRESS NOTES
Report received by day shift RN. Pt sitting up in bed with wife at bedside. Pt is afebrile with no c/o pain or N/V. Bed locked in low position with fall precaution in place. Call light in place with bed side table in reach.

## 2017-07-28 VITALS
HEART RATE: 60 BPM | SYSTOLIC BLOOD PRESSURE: 164 MMHG | RESPIRATION RATE: 18 BRPM | OXYGEN SATURATION: 96 % | BODY MASS INDEX: 27.67 KG/M2 | WEIGHT: 182.54 LBS | DIASTOLIC BLOOD PRESSURE: 68 MMHG | HEIGHT: 68 IN | TEMPERATURE: 98.9 F

## 2017-07-28 LAB
ALBUMIN SERPL BCP-MCNC: 3.1 G/DL (ref 3.2–4.9)
ALBUMIN/GLOB SERPL: 1.2 G/DL
ALP SERPL-CCNC: 257 U/L (ref 30–99)
ALT SERPL-CCNC: 101 U/L (ref 2–50)
ANION GAP SERPL CALC-SCNC: 8 MMOL/L (ref 0–11.9)
AST SERPL-CCNC: 52 U/L (ref 12–45)
BASOPHILS # BLD AUTO: 0.3 % (ref 0–1.8)
BASOPHILS # BLD: 0.02 K/UL (ref 0–0.12)
BILIRUB SERPL-MCNC: 1.3 MG/DL (ref 0.1–1.5)
BUN SERPL-MCNC: 9 MG/DL (ref 8–22)
CALCIUM SERPL-MCNC: 8.1 MG/DL (ref 8.5–10.5)
CHLORIDE SERPL-SCNC: 107 MMOL/L (ref 96–112)
CO2 SERPL-SCNC: 22 MMOL/L (ref 20–33)
CREAT SERPL-MCNC: 0.74 MG/DL (ref 0.5–1.4)
EOSINOPHIL # BLD AUTO: 0.13 K/UL (ref 0–0.51)
EOSINOPHIL NFR BLD: 1.9 % (ref 0–6.9)
ERYTHROCYTE [DISTWIDTH] IN BLOOD BY AUTOMATED COUNT: 42.8 FL (ref 35.9–50)
GFR SERPL CREATININE-BSD FRML MDRD: >60 ML/MIN/1.73 M 2
GLOBULIN SER CALC-MCNC: 2.6 G/DL (ref 1.9–3.5)
GLUCOSE SERPL-MCNC: 110 MG/DL (ref 65–99)
HCT VFR BLD AUTO: 29.3 % (ref 42–52)
HGB BLD-MCNC: 9.9 G/DL (ref 14–18)
IMM GRANULOCYTES # BLD AUTO: 0.04 K/UL (ref 0–0.11)
IMM GRANULOCYTES NFR BLD AUTO: 0.6 % (ref 0–0.9)
LYMPHOCYTES # BLD AUTO: 0.86 K/UL (ref 1–4.8)
LYMPHOCYTES NFR BLD: 12.7 % (ref 22–41)
MCH RBC QN AUTO: 29.7 PG (ref 27–33)
MCHC RBC AUTO-ENTMCNC: 33.8 G/DL (ref 33.7–35.3)
MCV RBC AUTO: 88 FL (ref 81.4–97.8)
MONOCYTES # BLD AUTO: 0.57 K/UL (ref 0–0.85)
MONOCYTES NFR BLD AUTO: 8.4 % (ref 0–13.4)
NEUTROPHILS # BLD AUTO: 5.13 K/UL (ref 1.82–7.42)
NEUTROPHILS NFR BLD: 76.1 % (ref 44–72)
NRBC # BLD AUTO: 0 K/UL
NRBC BLD AUTO-RTO: 0 /100 WBC
PLATELET # BLD AUTO: 117 K/UL (ref 164–446)
PMV BLD AUTO: 10.5 FL (ref 9–12.9)
POTASSIUM SERPL-SCNC: 3.7 MMOL/L (ref 3.6–5.5)
PROT SERPL-MCNC: 5.7 G/DL (ref 6–8.2)
RBC # BLD AUTO: 3.33 M/UL (ref 4.7–6.1)
SODIUM SERPL-SCNC: 137 MMOL/L (ref 135–145)
WBC # BLD AUTO: 6.8 K/UL (ref 4.8–10.8)

## 2017-07-28 PROCEDURE — 700102 HCHG RX REV CODE 250 W/ 637 OVERRIDE(OP): Performed by: HOSPITALIST

## 2017-07-28 PROCEDURE — 700105 HCHG RX REV CODE 258: Performed by: HOSPITALIST

## 2017-07-28 PROCEDURE — 80053 COMPREHEN METABOLIC PANEL: CPT

## 2017-07-28 PROCEDURE — A9270 NON-COVERED ITEM OR SERVICE: HCPCS | Performed by: HOSPITALIST

## 2017-07-28 PROCEDURE — 700101 HCHG RX REV CODE 250: Performed by: HOSPITALIST

## 2017-07-28 PROCEDURE — 36415 COLL VENOUS BLD VENIPUNCTURE: CPT

## 2017-07-28 PROCEDURE — 99239 HOSP IP/OBS DSCHRG MGMT >30: CPT | Performed by: HOSPITALIST

## 2017-07-28 PROCEDURE — 85025 COMPLETE CBC W/AUTO DIFF WBC: CPT

## 2017-07-28 PROCEDURE — 700111 HCHG RX REV CODE 636 W/ 250 OVERRIDE (IP): Performed by: HOSPITALIST

## 2017-07-28 RX ORDER — METRONIDAZOLE 500 MG/1
500 TABLET ORAL EVERY 8 HOURS
Qty: 12 TAB | Refills: 0 | Status: SHIPPED | OUTPATIENT
Start: 2017-07-28 | End: 2017-08-01

## 2017-07-28 RX ORDER — CIPROFLOXACIN 500 MG/1
500 TABLET, FILM COATED ORAL 2 TIMES DAILY
Qty: 8 TAB | Refills: 0 | Status: SHIPPED | OUTPATIENT
Start: 2017-07-28 | End: 2017-08-01

## 2017-07-28 RX ORDER — LANOLIN ALCOHOL/MO/W.PET/CERES
325 CREAM (GRAM) TOPICAL
Qty: 90 TAB | Refills: 3 | Status: SHIPPED | OUTPATIENT
Start: 2017-07-28

## 2017-07-28 RX ADMIN — ACETAMINOPHEN 650 MG: 325 TABLET, FILM COATED ORAL at 02:02

## 2017-07-28 RX ADMIN — PIPERACILLIN SODIUM AND TAZOBACTAM SODIUM 3.38 G: 3; .375 INJECTION, POWDER, FOR SOLUTION INTRAVENOUS at 05:31

## 2017-07-28 RX ADMIN — POTASSIUM CHLORIDE AND SODIUM CHLORIDE: 900; 150 INJECTION, SOLUTION INTRAVENOUS at 08:34

## 2017-07-28 RX ADMIN — FERROUS GLUCONATE 324 MG: 324 TABLET ORAL at 07:49

## 2017-07-28 RX ADMIN — ASPIRIN 325 MG: 325 TABLET, DELAYED RELEASE ORAL at 07:49

## 2017-07-28 ASSESSMENT — PAIN SCALES - GENERAL
PAINLEVEL_OUTOF10: 3
PAINLEVEL_OUTOF10: 0
PAINLEVEL_OUTOF10: 3
PAINLEVEL_OUTOF10: 0
PAINLEVEL_OUTOF10: 0

## 2017-07-28 NOTE — PROGRESS NOTES
Patient discharged home with wife by foot. Discharge paperwork provided with all questioned answered. IV discontinued and tip intact. Monitor box off, monitors aware and box returned. Patient it out of town and verbalizes importance to follow up with PCP within 2 weeks.

## 2017-07-28 NOTE — DISCHARGE SUMMARY
CHIEF COMPLAINT ON ADMISSION  Chief Complaint   Patient presents with   • Fever     since Saturday. 102.2 in triage, reports taking tylenol 30 min ago.    • Dizziness       CODE STATUS  Full Code    HPI & HOSPITAL COURSE  This is a 72 y.o. male here with fevers, diarrhea and transaminitis. He had an extensive workup all of which was negative. HE likely has a viral gastroenteritis and his LFT elevations were improving at the time of discharge. He has been afibrile for the last 24 hours. All cultures, abdominal ultrasound, HIDA, CT abdomen were normal aside from some mild splenomegaly. EBV titers showed no acute infection.      Therefore, he is discharged in good and stable condition with close outpatient follow-up.    SPECIFIC OUTPATIENT FOLLOW-UP  With PCP upon returning home.     DISCHARGE PROBLEM LIST  Principal Problem:    Fever and chills POA: Yes  Active Problems:    Anorexia POA: Yes    Diarrhea POA: Yes    Transaminitis POA: Yes    Hypokalemia due to loss of potassium POA: Yes    Thrombocytopenia (CMS-HCC) POA: Unknown    Iron deficiency anemia  Resolved Problems:    * No resolved hospital problems. *      FOLLOW UP  No future appointments.  Pcp Not In Computer    In 2 weeks        MEDICATIONS ON DISCHARGE   [facility] Medication Instructions MELVA:[id number]    Printed on:07/28/17 1310   Medication Information                      aspirin EC (ECOTRIN) 325 MG Tablet Delayed Response  Take 325 mg by mouth every day.             ciprofloxacin (CIPRO) 500 MG Tab  Take 1 Tab by mouth 2 times a day for 4 days.             ferrous sulfate 325 (65 FE) MG EC tablet  Take 1 Tab by mouth 3 times a day, with meals.             hydrALAZINE (APRESOLINE) 25 MG Tab  Take 25 mg by mouth 2 Times a Day.             losartan-hydrochlorothiazide (HYZAAR) 100-25 MG per tablet  Take 1 Tab by mouth every day.             meloxicam (MOBIC) 15 MG tablet  Take 15 mg by mouth every day.             metronidazole (FLAGYL) 500  MG Tab  Take 1 Tab by mouth every 8 hours for 4 days.             niacin SR (NIASPAN) 500 MG Tab CR  Take 500 mg by mouth every evening.             rosuvastatin (CRESTOR) 10 MG Tab  Take 10 mg by mouth every day.                 DIET  Orders Placed This Encounter   Procedures   • DIET ORDER     Standing Status: Standing      Number of Occurrences: 1      Standing Expiration Date:      Order Specific Question:  Diet:     Answer:  Regular [1]       ACTIVITY  Light duty.  Weight bearing as tolerated      CONSULTATIONS  none    PROCEDURES  none    LABORATORY  Lab Results   Component Value Date/Time    SODIUM 137 07/28/2017 03:23 AM    POTASSIUM 3.7 07/28/2017 03:23 AM    CHLORIDE 107 07/28/2017 03:23 AM    CO2 22 07/28/2017 03:23 AM    GLUCOSE 110* 07/28/2017 03:23 AM    BUN 9 07/28/2017 03:23 AM    CREATININE 0.74 07/28/2017 03:23 AM        Lab Results   Component Value Date/Time    WBC 6.8 07/28/2017 03:22 AM    HEMOGLOBIN 9.9* 07/28/2017 03:22 AM    HEMATOCRIT 29.3* 07/28/2017 03:22 AM    PLATELET COUNT 117* 07/28/2017 03:22 AM        Total time of the discharge process exceeds 38 minutes

## 2017-07-28 NOTE — CARE PLAN
Problem: Safety  Goal: Will remain free from injury  Outcome: PROGRESSING AS EXPECTED  Patient is alert and oriented, calls out appropriately, call bell within reach       Problem: Infection  Goal: Will remain free from infection  Outcome: PROGRESSING SLOWER THAN EXPECTED  Patient on IV antibiotics, Monitoring labs and vital sign

## 2017-07-28 NOTE — PROGRESS NOTES
Bedside report taken on patient, Assume care of patient. Patient is alert and oriented x 4, Assessment done on patient, patient denies any pain currently, has slight fever, denies any nausea and need at this time, wife at bedside, new bag of IVF hung

## 2017-07-28 NOTE — DISCHARGE INSTRUCTIONS
Discharge Instructions    Discharged to home by car with relative. Discharged via walking, hospital escort: Refused.  Special equipment needed: Not Applicable    Be sure to schedule a follow-up appointment with your primary care doctor or any specialists as instructed.     Discharge Plan:   Diet Plan: Discussed  Activity Level: Discussed  Confirmed Follow up Appointment: Patient to Call and Schedule Appointment  Confirmed Symptoms Management: Discussed  Medication Reconciliation Updated: Yes  Influenza Vaccine Indication: Patient Refuses    I understand that a diet low in cholesterol, fat, and sodium is recommended for good health. Unless I have been given specific instructions below for another diet, I accept this instruction as my diet prescription.   Other diet: Regular    Special Instructions: Sepsis, Adult  Sepsis is a serious infection of your blood or tissues that affects your whole body. The infection that causes sepsis may be bacterial, viral, fungal, or parasitic. Sepsis may be life threatening. Sepsis can cause your blood pressure to drop. This may result in shock. Shock causes your central nervous system and your organs to stop working correctly.   RISK FACTORS  Sepsis can happen in anyone, but it is more likely to happen in people who have weakened immune systems.  SIGNS AND SYMPTOMS   Symptoms of sepsis can include:  · Fever or low body temperature (hypothermia).  · Rapid breathing (hyperventilation).  · Chills.  · Rapid heartbeat (tachycardia).  · Confusion or light-headedness.  · Trouble breathing.  · Urinating much less than usual.  · Cool, clammy skin or red, flushed skin.  · Other problems with the heart, kidneys, or brain.  DIAGNOSIS   Your health care provider will likely do tests to look for an infection, to see if the infection has spread to your blood, and to see how serious your condition is. Tests can include:  · Blood tests, including cultures of your blood.  · Cultures of other fluids  from your body, such as:  ¨ Urine.  ¨ Pus from wounds.  ¨ Mucus coughed up from your lungs.  · Urine tests other than cultures.  · X-ray exams or other imaging tests.  TREATMENT   Treatment will begin with elimination of the source of infection. If your sepsis is likely caused by a bacterial or fungal infection, you will be given antibiotic or antifungal medicines.  You may also receive:  · Oxygen.  · Fluids through an IV tube.  · Medicines to increase your blood pressure.  · A machine to clean your blood (dialysis) if your kidneys fail.  · A machine to help you breathe if your lungs fail.  SEEK IMMEDIATE MEDICAL CARE IF:  You get an infection or develop any of the signs and symptoms of sepsis after surgery or a hospitalization.     This information is not intended to replace advice given to you by your health care provider. Make sure you discuss any questions you have with your health care provider.     Document Released: 09/15/2004 Document Revised: 05/03/2016 Document Reviewed: 08/25/2014  [employer] Interactive Patient Education ©2016 [employer]      · Is patient discharged on Warfarin / Coumadin?   No     · Is patient Post Blood Transfusion?  No    Depression / Suicide Risk    As you are discharged from this [facility] facility, it is important to learn how to keep safe from harming yourself.    Recognize the warning signs:  · Abrupt changes in personality, positive or negative- including increase in energy   · Giving away possessions  · Change in eating patterns- significant weight changes-  positive or negative  · Change in sleeping patterns- unable to sleep or sleeping all the time   · Unwillingness or inability to communicate  · Depression  · Unusual sadness, discouragement and loneliness  · Talk of wanting to die  · Neglect of personal appearance   · Rebelliousness- reckless behavior  · Withdrawal from people/activities they love  · Confusion- inability to concentrate     If you or a loved one observes  any of these behaviors or has concerns about self-harm, here's what you can do:  · Talk about it- your feelings and reasons for harming yourself  · Remove any means that you might use to hurt yourself (examples: pills, rope, extension cords, firearm)  · Get professional help from the community (Mental Health, Substance Abuse, psychological counseling)  · Do not be alone:Call your Safe Contact- someone whom you trust who will be there for you.  · Call your local CRISIS HOTLINE 432-7950 or 686-582-5566  · Call your local Children's Mobile Crisis Response Team [place] (469) 279-1299 or www.SeatMe  · Call the toll free National Suicide Prevention Hotlines   · National Suicide Prevention Lifeline 657-257-CXIO (0495)  · [employer] Network 800-SUICIDE (994-6234)    Thrombocytopenia  Thrombocytopenia means there are not enough platelets in your blood. Platelets are tiny cells in your blood. When you start bleeding, platelets clump together around the cut or injury to stop the bleeding. This process is called blood clotting. Not having enough platelets can cause bleeding problems.  HOME CARE  Check your skin and inside your mouth for bruises or blood as told by your doctor.  Check your spit (sputum), pee (urine), and poop (stool) for blood as told by your doctor.  Do not do activities that can cause bumps or bruises until your doctor says it is okay.  Be careful not to cut yourself when you shave or use scissors, needles, knives, or other tools.  Be careful not to burn yourself when you iron or cook.  Ask your doctor if you can drink alcohol.  Only take medicines as told by your doctor.  Tell all your doctors and your dentist that you have this bleeding problem.  GET HELP RIGHT AWAY IF:  You are bleeding anywhere on your body.  You are bleeding or have bruises without knowing why.  You have blood in your spit, pee, or poop.  MAKE SURE YOU:  Understand these instructions.  Will watch your  condition.  Will get help right away if you are not doing well or get worse.     This information is not intended to replace advice given to you by your health care provider. Make sure you discuss any questions you have with your health care provider.     Document Released: 12/06/2012 Document Revised: 03/11/2013 Document Reviewed: 12/06/2012  [employer] Interactive Patient Education ©2016 [employer].      Anemia, Frequently Asked Questions  WHAT ARE THE SYMPTOMS OF ANEMIA?  · Headache.   · Difficulty thinking.   · Fatigue.   · Shortness of breath.   · Weakness.   · Rapid heartbeat.   AT WHAT POINT ARE PEOPLE CONSIDERED ANEMIC?   This varies with gender and age.   · Both hemoglobin (Hgb) and hematocrit values are used to define anemia. These lab values are obtained from a complete blood count (CBC) test. This is performed at a caregiver's office.   · The normal range of hemoglobin values for adult men is 14.0 g/dL to 17.4 g/dL. For nonpregnant women, values are 12.3 g/dL to 15.3 g/dL.   · The World Health Organization defines anemia as less than 12 g/dL for nonpregnant women and less than 13 g/dL for men.   · For adult males, the average normal hematocrit is 46%, and the range is 40% to 52%.   · For adult females, the average normal hematocrit is 41%, and the range is 35% to 47%.   · Values that fall below the lower limits can be a sign of anemia and should have further checking (evaluation).   GROUPS OF PEOPLE WHO ARE AT RISK FOR DEVELOPING ANEMIA INCLUDE:   · Infants who are  or taking a formula that is not fortified with iron.   · Children going through a rapid growth spurt. The iron available can not keep up with the needs for a red cell mass which must grow with the child.   · Women in childbearing years. They need iron because of blood loss during menstruation.   · Pregnant women. The growing fetus creates a high demand for iron.   · People with ongoing gastrointestinal blood loss are at risk of  developing iron deficiency.   · Individuals with leukemia or cancer who must receive chemotherapy or radiation to treat their disease. The drugs or radiation used to treat these diseases often decreases the bone marrow's ability to make cells of all classes. This includes red blood cells, white blood cells, and platelets.   · Individuals with chronic inflammatory conditions such as rheumatoid arthritis or chronic infections.   · The elderly.   ARE SOME TYPES OF ANEMIA INHERITED?   · Yes, some types of anemia are due to inherited or genetic defects.   · Sickle cell anemia. This occurs most often in people of , , and Mediterranean descent.   · Thalassemia (or Fitch's anemia). This type is found in people of Mediterranean and Southeast  descent. These types of anemia are common.   · Fanconi. This is rare.   CAN CERTAIN MEDICATIONS CAUSE A PERSON TO BECOME ANEMIC?   Yes. For example, drugs to fight cancer (chemotherapeutic agents) often cause anemia. These drugs can slow the bone marrow's ability to make red blood cells. If there are not enough red blood cells, the body does not get enough oxygen.  WHAT HEMATOCRIT LEVEL IS REQUIRED TO DONATE BLOOD?   The lower limit of an acceptable hematocrit for blood donors is 38%. If you have a low hematocrit value, you should schedule an appointment with your caregiver.  ARE BLOOD TRANSFUSIONS COMMONLY USED TO CORRECT ANEMIA, AND ARE THEY DANGEROUS?   They are used to treat anemia as a last resort. Your caregiver will find the cause of the anemia and correct it if possible. Most blood transfusions are given because of excessive bleeding at the time of surgery, with trauma, or because of bone marrow suppression in patients with cancer or leukemia on chemotherapy. Blood transfusions are safer than ever before. We also know that blood transfusions affect the immune system and may increase certain risks. There is also a concern for human error. In 1/16,000  transfusions, a patient receives a transfusion of blood that is not matched with his or her blood type.   WHAT IS IRON DEFICIENCY ANEMIA AND CAN I CORRECT IT BY CHANGING MY DIET?   Iron is an essential part of hemoglobin. Without enough hemoglobin, anemia develops and the body does not get the right amount of oxygen. Iron deficiency anemia develops after the body has had a low level of iron for a long time. This is either caused by blood loss, not taking in or absorbing enough iron, or increased demands for iron (like pregnancy or rapid growth).   Foods from animal origin such as beef, chicken, and pork, are good sources of iron. Be sure to have one of these foods at each meal. Vitamin C helps your body absorb iron. Foods rich in Vitamin C include citrus, bell pepper, strawberries, spinach and cantaloupe. In some cases, iron supplements may be needed in order to correct the iron deficiency. In the case of poor absorption, extra iron may have to be given directly into the vein through a needle (intravenously).  I HAVE BEEN DIAGNOSED WITH IRON DEFICIENCY ANEMIA AND MY CAREGIVER PRESCRIBED IRON SUPPLEMENTS. HOW LONG WILL IT TAKE FOR MY BLOOD TO BECOME NORMAL?   It depends on the degree of anemia at the beginning of treatment. Most people with mild to moderate iron deficiency, anemia will correct the anemia over a period of 2 to 3 months. But after the anemia is corrected, the iron stored by the body is still low. Caregivers often suggest an additional 6 months of oral iron therapy once the anemia has been reversed. This will help prevent the iron deficiency anemia from quickly happening again. Non-anemic adult males should take iron supplements only under the direction of a doctor, too much iron can cause liver damage.   MY HEMOGLOBIN IS 9 G/DL AND I AM SCHEDULED FOR SURGERY. SHOULD I POSTPONE THE SURGERY?   If you have Hgb of 9, you should discuss this with your caregiver right away. Many patients with similar  hemoglobin levels have had surgery without problems. If minimal blood loss is expected for a minor procedure, no treatment may be necessary.   If a greater blood loss is expected for more extensive procedures, you should ask your caregiver about being treated with erythropoietin and iron. This is to accelerate the recovery of your hemoglobin to a normal level before surgery. An anemic patient who undergoes high-blood-loss surgery has a greater risk of surgical complications and need for a blood transfusion, which also carries some risk.   I HAVE BEEN TOLD THAT HEAVY MENSTRUAL PERIODS CAUSE ANEMIA. IS THERE ANYTHING I CAN DO TO PREVENT THE ANEMIA?   Anemia that results from heavy periods is usually due to iron deficiency. You can try to meet the increased demands for iron caused by the heavy monthly blood loss by increasing the intake of iron-rich foods. Iron supplements may be required. Discuss your concerns with your caregiver.  WHAT CAUSES ANEMIA DURING PREGNANCY?   Pregnancy places major demands on the body. The mother must meet the needs of both her body and her growing baby. The body needs enough iron and folate to make the right amount of red blood cells. To prevent anemia while pregnant, the mother should stay in close contact with her caregiver.   Be sure to eat a diet that has foods rich in iron and folate like liver and dark green leafy vegetables. Folate plays an important role in the normal development of a baby's spinal cord. Folate can help prevent serious disorders like spina bifida. If your diet does not provide adequate nutrients, you may want to talk with your caregiver about nutritional supplements.   WHAT IS THE RELATIONSHIP BETWEEN FIBROID TUMORS AND ANEMIA IN WOMEN?   The relationship is usually caused by the increased menstrual blood loss caused by fibroids. Good iron intake may be required to prevent iron deficiency anemia from developing.   Document Released: 07/26/2005 Document Revised:  03/11/2013 Document Reviewed: 01/10/2012  [employer]® Patient Information ©2013 [employer].    Hypokalemia  Hypokalemia means that the amount of potassium in the blood is lower than normal. Potassium is a chemical, called an electrolyte, that helps regulate the amount of fluid in the body. It also stimulates muscle contraction and helps nerves function properly. Most of the body's potassium is inside of cells, and only a very small amount is in the blood. Because the amount in the blood is so small, minor changes can be life-threatening.  CAUSES  · Antibiotics.  · Diarrhea or vomiting.  · Using laxatives too much, which can cause diarrhea.  · Chronic kidney disease.  · Water pills (diuretics).  · Eating disorders (bulimia).  · Low magnesium level.  · Sweating a lot.  SIGNS AND SYMPTOMS  · Weakness.  · Constipation.  · Fatigue.  · Muscle cramps.  · Mental confusion.  · Skipped heartbeats or irregular heartbeat (palpitations).  · Tingling or numbness.  DIAGNOSIS   Your health care provider can diagnose hypokalemia with blood tests. In addition to checking your potassium level, your health care provider may also check other lab tests.  TREATMENT  Hypokalemia can be treated with potassium supplements taken by mouth or adjustments in your current medicines. If your potassium level is very low, you may need to get potassium through a vein (IV) and be monitored in the hospital. A diet high in potassium is also helpful. Foods high in potassium are:  · Nuts, such as peanuts and pistachios.  · Seeds, such as sunflower seeds and pumpkin seeds.  · Peas, lentils, and lima beans.  · Whole grain and bran cereals and breads.  · Fresh fruit and vegetables, such as apricots, avocado, bananas, cantaloupe, kiwi, oranges, tomatoes, asparagus, and potatoes.  · Orange and tomato juices.  · Red meats.  · Fruit yogurt.  HOME CARE INSTRUCTIONS  · Take all medicines as prescribed by your health care provider.  · Maintain a healthy diet by  including nutritious food, such as fruits, vegetables, nuts, whole grains, and lean meats.  · If you are taking a laxative, be sure to follow the directions on the label.  SEEK MEDICAL CARE IF:  · Your weakness gets worse.  · You feel your heart pounding or racing.  · You are vomiting or having diarrhea.  · You are diabetic and having trouble keeping your blood glucose in the normal range.  SEEK IMMEDIATE MEDICAL CARE IF:  · You have chest pain, shortness of breath, or dizziness.  · You are vomiting or having diarrhea for more than 2 days.  · You faint.  MAKE SURE YOU:   · Understand these instructions.  · Will watch your condition.  · Will get help right away if you are not doing well or get worse.     This information is not intended to replace advice given to you by your health care provider. Make sure you discuss any questions you have with your health care provider.     Document Released: 12/18/2006 Document Revised: 01/08/2016 Document Reviewed: 06/20/2014  [employer] Interactive Patient Education ©2016 [employer].    Ciprofloxacin extended-release tablets  What is this medicine?  CIPROFLOXACIN (sip dorene FLOX a sin) is a quinolone antibiotic. It is used to treat certain kinds of bacterial infections. It will not work for colds, flu, or other viral infections.  This medicine may be used for other purposes; ask your health care provider or pharmacist if you have questions.  COMMON BRAND NAME(S): Cipro XR, Proquin XR  What should I tell my health care provider before I take this medicine?  They need to know if you have any of these conditions:  -bone problems  -cerebral disease  -  irregular heartbeat  -joint problems  -kidney disease  -liver disease  -myasthenia gravis  -seizures disorder  -tendon problems  -an unusual or allergic reaction to ciprofloxacin, other antibiotics or medicines, foods, dyes, or preservatives  -pregnant or trying to get pregnant  -breast-feeding  How should I use this medicine?  Take  this medicine by mouth with a full glass of water. Follow the directions on the prescription label. Do not split, crush, or chew the tablet. Take your medicine at regular intervals. Do not take your medicine more often than directed. Take all of your medicine as directed even if you think your are better. Do not skip doses or stop your medicine early.  You can take this medicine with food or on an empty stomach. It can be taken with a meal that contains dairy or calcium, but do not take it alone with a dairy product, like milk or yogurt, or calcium-fortified juice.  A special MedGuide will be given to you by the pharmacist with each prescription and refill. Be sure to read this information carefully each time.  Talk to your pediatrician regarding the use of this medicine in children. Special care may be needed.  Overdosage: If you think you have taken too much of this medicine contact a poison control center or emergency room at once.  NOTE: This medicine is only for you. Do not share this medicine with others.  What if I miss a dose?  If you miss a dose, take it as soon as you can. If it is almost time for your next dose, take only that dose. Do not take double or extra doses. Do not take more than one dose in a day.  What may interact with this medicine?  Do not take this medicine with any of the following medications:  -cisapride  -droperidol  -terfenadine  -tizanidine  This medicine may also interact with the following medications:  -antacids  -caffeine  -cyclosporin  -didanosine (ddI) buffered tablets or powder  -medicines for diabetes  -medicines for inflammation like ibuprofen, naproxen  -methotrexate  -multivitamins  -omeprazole  -phenytoin  -probenecid  -sucralfate  -theophylline  -warfarin  This list may not describe all possible interactions. Give your health care provider a list of all the medicines, herbs, non-prescription drugs, or dietary supplements you use. Also tell them if you smoke, drink  alcohol, or use illegal drugs. Some items may interact with your medicine.  What should I watch for while using this medicine?  Tell your doctor or health care professional if your symptoms do not improve.  Do not treat diarrhea with over the counter products. Contact your doctor if you have diarrhea that lasts more than 2 days or if it is severe and watery.  You may get drowsy or dizzy. Do not drive, use machinery, or do anything that needs mental alertness until you know how this medicine affects you. Do not stand or sit up quickly, especially if you are an older patient. This reduces the risk of dizzy or fainting spells.  This medicine can make you more sensitive to the sun. Keep out of the sun. If you cannot avoid being in the sun, wear protective clothing and use sunscreen. Do not use sun lamps or tanning beds/booths.  Avoid antacids, aluminum, calcium, iron, magnesium, and zinc products for 6 hours before and 2 hours after taking a dose of this medicine.  What side effects may I notice from receiving this medicine?  Side effects that you should report to your doctor or health care professional as soon as possible:  -  allergic reactions like skin rash, itching or hives, swelling of the face, lips, or tongue  -  breathing problems  -  confusion, nightmares or hallucinations  -  feeling faint or lightheaded, falls  -  irregular heartbeat  -  joint, muscle or tendon pain or swelling  -  pain or trouble passing urine  -persistent headache with or without blurred vision  -  redness, blistering, peeling or loosening of the skin, including inside the mouth  -  seizures  -  unusual pain, numbness, tingling, or weakness  Side effects that usually do not require medical attention (report to your doctor or health care professional if they continue or are bothersome):  -  diarrhea  -  nausea or stomach upset  -  white patches or sores in the mouth  This list may not describe all possible side effects. Call your doctor for  medical advice about side effects. You may report side effects to FDA at 9-019-FDA-9564.  Where should I keep my medicine?  Keep out of the reach of children.  Store at room temperature between 15 to 30 degrees C (59 to 86 degrees F). Keep container tightly closed. Throw away any unused medicine after the expiration date.  NOTE: This sheet is a summary. It may not cover all possible information. If you have questions about this medicine, talk to your doctor, pharmacist, or health care provider.  © 2014, [employer]. (1/4/2013 12:48:34 PM)  Metronidazole tablets or capsules  What is this medicine?  METRONIDAZOLE (me troe NI da zole) is an antiinfective. It is used to treat certain kinds of bacterial and protozoal infections. It will not work for colds, flu, or other viral infections.  This medicine may be used for other purposes; ask your health care provider or pharmacist if you have questions.  COMMON BRAND NAME(S): Flagyl  What should I tell my health care provider before I take this medicine?  They need to know if you have any of these conditions:  -anemia or other blood disorders  -disease of the nervous system  -fungal or yeast infection  -if you drink alcohol containing drinks  -liver disease  -seizures  -an unusual or allergic reaction to metronidazole, or other medicines, foods, dyes, or preservatives  -pregnant or trying to get pregnant  -breast-feeding  How should I use this medicine?  Take this medicine by mouth with a full glass of water. Follow the directions on the prescription label. Take your medicine at regular intervals. Do not take your medicine more often than directed. Take all of your medicine as directed even if you think you are better. Do not skip doses or stop your medicine early.  Talk to your pediatrician regarding the use of this medicine in children. Special care may be needed.  Overdosage: If you think you have taken too much of this medicine contact a poison control center  or emergency room at once.  NOTE: This medicine is only for you. Do not share this medicine with others.  What if I miss a dose?  If you miss a dose, take it as soon as you can. If it is almost time for your next dose, take only that dose. Do not take double or extra doses.  What may interact with this medicine?  Do not take this medicine with any of the following medications:  -alcohol or any product that contains alcohol  -amprenavir oral solution  -disulfiram  -paclitaxel injection  -ritonavir oral solution  -sertraline oral solution  -sulfamethoxazole-trimethoprim injection  This medicine may also interact with the following medications:  -cimetidine  -lithium  -phenobarbital  -phenytoin  -warfarin  This list may not describe all possible interactions. Give your health care provider a list of all the medicines, herbs, non-prescription drugs, or dietary supplements you use. Also tell them if you smoke, drink alcohol, or use illegal drugs. Some items may interact with your medicine.  What should I watch for while using this medicine?  Tell your doctor or health care professional if your symptoms do not improve or if they get worse.  You may get drowsy or dizzy. Do not drive, use machinery, or do anything that needs mental alertness until you know how this medicine affects you. Do not stand or sit up quickly, especially if you are an older patient. This reduces the risk of dizzy or fainting spells.  Avoid alcoholic drinks while you are taking this medicine and for three days afterward. Alcohol may make you feel dizzy, sick, or flushed.  If you are being treated for a sexually transmitted disease, avoid sexual contact until you have finished your treatment. Your sexual partner may also need treatment.  What side effects may I notice from receiving this medicine?  Side effects that you should report to your doctor or health care professional as soon as possible:  -allergic reactions like skin rash or hives, swelling  of the face, lips, or tongue  -confusion, clumsiness  -difficulty speaking  -discolored or sore mouth  -dizziness  -fever, infection  -numbness, tingling, pain or weakness in the hands or feet  -trouble passing urine or change in the amount of urine  -redness, blistering, peeling or loosening of the skin, including inside the mouth  -seizures  -unusually weak or tired  -vaginal irritation, dryness, or discharge  Side effects that usually do not require medical attention (report to your doctor or health care professional if they continue or are bothersome):  -diarrhea  -headache  -irritability  -metallic taste  -nausea  -stomach pain or cramps  -trouble sleeping  This list may not describe all possible side effects. Call your doctor for medical advice about side effects. You may report side effects to FDA at 8-402-FDA-1393.  Where should I keep my medicine?  Keep out of the reach of children.  Store at room temperature below 25 degrees C (77 degrees F). Protect from light. Keep container tightly closed. Throw away any unused medicine after the expiration date.  NOTE: This sheet is a summary. It may not cover all possible information. If you have questions about this medicine, talk to your doctor, pharmacist, or health care provider.  © 2014, [employer]. (10/5/2009 10:37:11 AM)

## 2017-07-28 NOTE — PROGRESS NOTES
Report received from PM RN, care of patient assumed. POC discussed at bedside, no immediate concerns stated at this time. Safety measures in place/call light in reach. Will continue to round hourly.

## 2017-07-28 NOTE — CARE PLAN
Problem: Communication  Goal: The ability to communicate needs accurately and effectively will improve  Outcome: PROGRESSING AS EXPECTED  POC discussed at bedside - patient verbalizes understanding.  Education on medications and procedures provided.   White board updated, patient encouraged to call for all needs. Calls appropriately. No immediate concerns at this time.         Problem: Safety  Goal: Will remain free from falls  Outcome: PROGRESSING AS EXPECTED  [place] Fall Risk Assessment:     Last Known Fall: No falls  Mobility: No limitations  Medications: No meds  Mental Status/LOC/Awareness: Awake, alert, and oriented to date, place, and person  Toileting Needs: No needs  Volume/Electrolyte Status: Use of IV fluids/tube feeds  Communication/Sensory: Visual (Glasses)/hearing deficit  Behavior: Appropriate behavior  [place] Fall Risk Total: 6  Fall Risk Level: NO RISK    Universal Fall Precautions:  call light/belongings in reach, bed in low position and locked, use non-slip footwear, adequate lighting, clutter free and spill free environment, educate on level of risk, educate to call for assistance    Fall Risk Level Interventions:          Patient Specific Interventions:     Medication: review medications with patient and family  Mental Status/LOC/Awareness: not applicable  Toileting: not applicable  Volume/Electrolyte Status: ensure patient remains hydrated  Communication/Sensory: update plan of care on whiteboard  Behavioral: not applicable  Mobility: not applicable

## 2017-07-29 LAB
BACTERIA STL CULT: NORMAL
E COLI SXT1+2 STL IA: NORMAL
SIGNIFICANT IND 70042: NORMAL
SITE SITE: NORMAL
SOURCE SOURCE: NORMAL

## 2017-07-30 LAB
BACTERIA BLD CULT: NORMAL
BACTERIA BLD CULT: NORMAL
SIGNIFICANT IND 70042: NORMAL
SIGNIFICANT IND 70042: NORMAL
SITE SITE: NORMAL
SITE SITE: NORMAL
SOURCE SOURCE: NORMAL
SOURCE SOURCE: NORMAL

## 2017-08-01 NOTE — DOCUMENTATION QUERY
DOCUMENTATION QUERY    PROVIDERS: Please select “Cosign w/ note”to reply to query.    To better represent the severity of illness of your patient, please review the following information and exercise your independent professional judgment in responding to this query.      Severe sepsis is documented without related organ failure/dysfunction/shock,. Per coding guidelines, the clinical indicator for severe sepsis is a major organ failure/dysfunction/shock documented as DUE to sepsis.        Based upon the clinical findings, risk factors, and treatment, please clarify whether    Please select all that apply:     Severe sepsis with related acute organ failure/dysfunction (must specify organ failure/dysfunction)    Sepsis without related organ failure/dysfunction, no severe sepsis                  Sepsis ruled out     Other explanation of clinical findings (please document)       Unable to determine      The medical record reflects the following:   Clinical Findings Fever and chills (present on admission)  Assessment & Plan  Unclear etiology  CXR negative  ?acute gastroenteritis, check CT abd/pelvis,     Progress Notes:    Chief Complaint  72 y.o. male admitted 7/25/2017 with severe sepsis, diarrhea, fever up to 104.5, transaminitis. U/s GB and CT abdomen/pelvis normal so far. H/o diarrhea up until anorexia set in. No recent abx use.      DISCHARGE PROBLEM LIST  Principal Problem:    Fever and chills POA: Yes  Active Problems:    Anorexia POA: Yes    Diarrhea POA: Yes    Transaminitis POA: Yes    Hypokalemia due to loss of potassium POA: Yes    Thrombocytopenia (CMS-HCC) POA: Unknown     Treatment    Risk Factors     Location within medical record H&P Progress notes      Thank you,   Farnaz Salvadro

## 2021-09-22 NOTE — ASSESSMENT & PLAN NOTE
Patient Education     Gout Diet  Gout is a painful condition caused by an excess of uric acid, a waste product made by the body. Uric acid forms crystals that collect in the joints. The immune response to these crystals brings on symptoms of joint pain and swelling. This is called a gout attack. Often, medications and diet changes are combined to manage gout. Below are some guidelines for changing your diet to help you manage gout and prevent attacks. Your healthcare provider will help you determine the best eating plan for you.  Eating to manage gout  Weight loss for those who are overweight may help reduce gout attacks.  Eat less of these foods  Eating too many foods containing purines may raise the levels of uric acid in your body. This raises your risk for a gout attack. Try to limit these foods and drinks:  · Alcohol, such as beer and red wine. You may be told to avoid alcohol completely.  · Soft drinks that contain sugar or high fructose corn syrup  · Certain fish, including anchovies, sardines, fish eggs, and herring  · Shellfish  · Certain meats, such as red meat, hot dogs, luncheon meats, and turkey  · Organ meats, such as liver, kidneys, and sweetbreads  · Legumes, such as dried beans and peas  · Other high fat foods such as gravy, whole milk, and high fat cheeses  · Vegetables such as asparagus, cauliflower, spinach, and mushrooms used to be thought to contribute to an increased risk for a gout attack, but recent studies show that high purine vegetables don't increase the risk for a gout attack.  Eat more of these foods  Other foods may be helpful for people with gout. Add some of these foods to your diet:  · Cherries contain chemicals that may lower uric acid.  · Omega fatty acids. These are found in some fatty fish such as salmon, certain oils (flax, olive, or nut), and nuts themselves. Omega fatty acids may help prevent inflammation due to gout.  · Dairy products that are low-fat or fat-free, such as  Elevated in non daily alcohol drinker  Normal u/s liver and only splenomegaly seen on CT abd/pelvis with contrast  Hepatitis negative, normal HIDA scan. 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      cheese and yogurt  · Complex carbohydrate foods, including whole grains, brown rice, oats, and beans  · Coffee, in moderation  · Water, approximately 64 ounces per day  Follow-up care  Follow up with your healthcare provider as advised.  When to seek medical advice  Call your healthcare provider right away if any of these occur:  · Return of gout symptoms, usually at night:  · Severe pain, swelling, and heat in a joint, especially the base of the big toe  · Affected joint is hard to move  · Skin of the affected joint is purple or red  · Fever of 100.4°F (38°C) or higher  · Pain that doesn't get better even with prescribed medicine   [employer] last reviewed this educational content on 6/1/2018  © 3019-5028 [employer]. All rights reserved. This information is not intended as a substitute for professional medical care. Always follow your healthcare professional's instructions.